# Patient Record
Sex: MALE | Race: WHITE | NOT HISPANIC OR LATINO | Employment: FULL TIME | URBAN - METROPOLITAN AREA
[De-identification: names, ages, dates, MRNs, and addresses within clinical notes are randomized per-mention and may not be internally consistent; named-entity substitution may affect disease eponyms.]

---

## 2020-03-16 ENCOUNTER — TELEPHONE (OUTPATIENT)
Dept: FAMILY MEDICINE | Facility: CLINIC | Age: 50
End: 2020-03-16

## 2020-03-17 ENCOUNTER — TELEPHONE (OUTPATIENT)
Dept: FAMILY MEDICINE | Facility: CLINIC | Age: 50
End: 2020-03-17

## 2020-03-17 NOTE — TELEPHONE ENCOUNTER
LMOR letting pt know we are having to cancel the appointment for tomorrow but to call back because we are able to get him scheduled for a virtual visit.

## 2020-04-22 ENCOUNTER — TELEPHONE (OUTPATIENT)
Dept: FAMILY MEDICINE | Facility: CLINIC | Age: 50
End: 2020-04-22

## 2020-04-22 NOTE — TELEPHONE ENCOUNTER
Tried calling pt to see about getting him rescheduled from his canceled appt on 3/18. Not able to leave a message due to voicemail box not set up yet

## 2020-06-29 ENCOUNTER — TELEPHONE (OUTPATIENT)
Dept: FAMILY MEDICINE | Facility: CLINIC | Age: 50
End: 2020-06-29

## 2020-06-29 NOTE — TELEPHONE ENCOUNTER
----- Message from Miguel Barrera sent at 6/29/2020  9:56 AM CDT -----  Regarding: R/s appt  Contact: Stewart Barreto  Pt would like to schedule a virtual visit appt with Dr. Morris the mid/ End of July on a Monday, Thurs, or Friday . His last visit was cancelled due to the virus outbreak . Please give the patient a call back # [phone]

## 2020-06-29 NOTE — TELEPHONE ENCOUNTER
Spoke with pt and let him know since we have not seen him in over a year we have to get him in office. Patient agrees.

## 2020-07-14 ENCOUNTER — OFFICE VISIT (OUTPATIENT)
Dept: ALLERGY | Facility: CLINIC | Age: 50
End: 2020-07-14
Payer: COMMERCIAL

## 2020-07-14 ENCOUNTER — LAB VISIT (OUTPATIENT)
Dept: LAB | Facility: HOSPITAL | Age: 50
End: 2020-07-14
Attending: ALLERGY & IMMUNOLOGY
Payer: COMMERCIAL

## 2020-07-14 VITALS
OXYGEN SATURATION: 97 % | HEART RATE: 95 BPM | BODY MASS INDEX: 40.9 KG/M2 | HEIGHT: 72 IN | SYSTOLIC BLOOD PRESSURE: 112 MMHG | WEIGHT: 302 LBS | DIASTOLIC BLOOD PRESSURE: 76 MMHG | TEMPERATURE: 97 F

## 2020-07-14 DIAGNOSIS — J31.0 CHRONIC RHINITIS: ICD-10-CM

## 2020-07-14 DIAGNOSIS — G12.20 MOTOR NEURON DISEASE: ICD-10-CM

## 2020-07-14 DIAGNOSIS — J31.0 CHRONIC RHINITIS: Primary | ICD-10-CM

## 2020-07-14 PROCEDURE — 3008F BODY MASS INDEX DOCD: CPT | Mod: S$GLB,,, | Performed by: ALLERGY & IMMUNOLOGY

## 2020-07-14 PROCEDURE — 99203 OFFICE O/P NEW LOW 30 MIN: CPT | Mod: S$GLB,,, | Performed by: ALLERGY & IMMUNOLOGY

## 2020-07-14 PROCEDURE — 3008F PR BODY MASS INDEX (BMI) DOCUMENTED: ICD-10-PCS | Mod: S$GLB,,, | Performed by: ALLERGY & IMMUNOLOGY

## 2020-07-14 PROCEDURE — 86003 ALLG SPEC IGE CRUDE XTRC EA: CPT

## 2020-07-14 PROCEDURE — 99203 PR OFFICE/OUTPT VISIT, NEW, LEVL III, 30-44 MIN: ICD-10-PCS | Mod: S$GLB,,, | Performed by: ALLERGY & IMMUNOLOGY

## 2020-07-14 PROCEDURE — 36415 COLL VENOUS BLD VENIPUNCTURE: CPT

## 2020-07-14 RX ORDER — MAGNESIUM OXIDE 500 MG
TABLET ORAL
COMMUNITY
Start: 1996-07-01

## 2020-07-14 RX ORDER — DIAPER,BRIEF,ADULT, DISPOSABLE
1 EACH MISCELLANEOUS
COMMUNITY
End: 2024-03-25

## 2020-07-14 RX ORDER — BENZOCAINE .13; .15; .5; 2 G/100G; G/100G; G/100G; G/100G
1 GEL ORAL 2 TIMES DAILY
Qty: 8.6 G | Refills: 3 | Status: SHIPPED | OUTPATIENT
Start: 2020-07-14 | End: 2022-03-07

## 2020-07-14 RX ORDER — OMEGA-3-ACID ETHYL ESTERS 1 G/1
CAPSULE, LIQUID FILLED ORAL
COMMUNITY
End: 2020-07-30

## 2020-07-14 RX ORDER — AZELASTINE 1 MG/ML
1 SPRAY, METERED NASAL 2 TIMES DAILY
Qty: 30 ML | Refills: 3 | Status: SHIPPED | OUTPATIENT
Start: 2020-07-14 | End: 2022-03-07

## 2020-07-14 RX ORDER — TEMAZEPAM 15 MG/1
15 CAPSULE ORAL
COMMUNITY
End: 2020-07-30 | Stop reason: SDUPTHER

## 2020-07-14 RX ORDER — FAMOTIDINE 20 MG/1
20 TABLET, FILM COATED ORAL
COMMUNITY

## 2020-07-14 RX ORDER — COLCHICINE 0.6 MG/1
0.6 TABLET ORAL DAILY PRN
COMMUNITY
End: 2020-07-30 | Stop reason: SDUPTHER

## 2020-07-14 RX ORDER — LANOLIN ALCOHOL/MO/W.PET/CERES
CREAM (GRAM) TOPICAL
COMMUNITY
Start: 2008-07-01

## 2020-07-14 NOTE — PATIENT INSTRUCTIONS
Nose:  Saline first 1-2 times per day  Next azelastine 1 spray per nare twice per day - if symptoms worsen, may use up to 4 times per day - haley, may use as needed - congestion and post nasal drip   Then rhinocort 1 spray per nare twice per day- batman     Technique:  Head down  Aim up and out   Spray don't sniff      As needed zyrtec for itching, sneezing     [facility]- you do not need to fast   - may get done today.       If symptoms get flared, you can use distilled water and xlear packets for saline instead and this can help clean out sinuses better.         Follow up in 4-6 weeks , sooner if needed

## 2020-07-14 NOTE — PROGRESS NOTES
Subjective:       Patient ID: Stewart Barreto is a 49 y.o. male.    Chief Complaint: Rhinitis (runny nose, throat clearing, snoring. Nasal rinses do help) and Recurrent Sinusitis (2-3 times per year takes antibiotics)    HPI     Pt presents as a new patient for rhinitis    Onset: 4 th grade   Sx: pnd, rn, throat clearing.   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   Or neuromuscular disorder.   Every 2 weeks gets IVIG for this.   He is uncertain of dose.   2.5 -3.5 hours  With saline bolus.   Neurologist Dr. Kymberly hanna. [facility] [place].     Review of Systems      General: neg unexpected weight changes, fevers, chills, night sweats, malaise  HEENT: see hpi, Neg eye pain, vision changes, ear drainage, nose bleeds, throat tightness, sores in the mouth  CV: Neg chest pain, palpitations, swelling  Resp: see hpi, neg shortness of breath, hemoptysis, cough  GI: see hpi, neg dysphagia, night abdominal pain, reflux, chronic diarrhea, chronic constipation  Derm: See Hpi, neg new rash, neg flushing  Mu/sk: Neg joint pain, joint swelling   Psych: Neg anxiety  neuro: neg chronic headaches, muscle weakness  Endo: neg heat/cold intolerance, chronic fatigue    Objective:     Vitals:    07/14/20 1412   BP: 112/76   Pulse: 95   Temp: 97.1 °F (36.2 °C)   TempSrc: Temporal   SpO2: 97%   Weight: (!) 137 kg (302 lb)   Height: 6' (1.829 m)        Physical Exam    General: no acute distress, well developed well nourished   HEENT:   Head:normocephalic atraumatic  Eyes: GRACE, EOMI, Neg injection, scleral icterus, or conjunctival papillary hypertrophy.  Ears: tm clear bilaterally, normal canal  Nose: 2-3+  inferior turbinates pink, neg nasal polyps            Mucosa: moist             Septal irritation: none   OP: mucus membranes moist, - cobblestoning, + PND, neg erythema or lesions  Neck: supple, Full range of motion, neg lymphadenopathy  Chest: full respiratory excursion no abnormal chest abnormality  Resp: clear to ascultation bilaterally  CV: RRR, neg MRG, brisk capillary refill  Abdomen: BS+, non tender, non distended  Ext:  Neg clubbing, cyanosis, pitting edema  Skin: Neg rashes or lesions  Lymph: neg supraclavicular, axillary     Assessment:       1. Chronic rhinitis    2. Motor neuron disease        Plan:       Chronic rhinitis  -     Inhalant Panel; Future; Expected date: 07/14/2020  -     azelastine (ASTELIN) 137 mcg (0.1 %) nasal spray; 1 spray (137 mcg total) by Nasal route 2 (two) times daily.  Dispense: 30 mL; Refill: 3  -     budesonide (RINOCORT AQUA) 32 mcg/actuation nasal spray; 1 spray (32 mcg total) by Nasal route 2 (two) times daily.  Dispense: 8.6 g; Refill: 3    Motor neuron disease      Chronic rhinitis  Continue saline   Start INS and ZIA routinely   Allergy test serum IgE - [facility].     Motor Neruon disease   Continue neurology IVIG therapy, likely 2g/kg dosing or higher.     Follow up in 4-6 weeks sooner if needed        Preethi Ponce M.D.  Allergy/Immunology  [facility]   [phone] phone  [phone] fax

## 2020-07-18 LAB
A ALTERNATA IGE QN: <0.1 KU/L
A FUMIGATUS IGE QN: <0.1 KU/L
ALLERGEN CLASS DESCRIPTION: NORMAL
ALLERGEN SILVER BIRCH TREE IGE: <0.1 KU/L
AMER ROACH IGE QN: <0.1 KU/L
BAHIA GRASS IGE QN: <0.1 KU/L
BALD CYPRESS IGE QN: <0.1 KU/L
BERMUDA GRASS IGE QN: <0.1 KU/L
BOXELDER IGE QN: <0.1 KU/L
C ALBICANS IGE QN: <0.1 KU/L
C ALBICANS IGE QN: <0.1 KU/L
C HERBARUM IGE QN: <0.1 KU/L
CAT DANDER IGE QN: <0.1 KU/L
CMN PIGWEED IGE QN: <0.1 KU/L
COCKLEBUR IGE QN: <0.1 KU/L
COMMON RAGWEED IGE QN: <0.1 KU/L
COTTONWOOD IGE QN: <0.1 KU/L
CURVULARIA IGE QN: <0.1 KU/L
D FARINAE IGE QN: <0.1 KU/L
D PTERONYSS IGE QN: <0.1 KU/L
DOG DANDER IGE QN: <0.1 KU/L
DOG FENNEL IGE QN: <0.1 KU/L
E PURPURASCENS IGE QN: <0.1 KU/L
ENGL PLANTAIN IGE QN: <0.1 KU/L
F MONILIFORME IGE QN: <0.1 KU/L
GIANT RAGWEED IGE QN: <0.1 KU/L
HACKBERRY TREE IGE QN: <0.1 KU/L
JOHNSON GRASS IGE QN: <0.1 KU/L
LONDON PLANE IGE QN: <0.1 KU/L
MARSH ELDER IGE QN: <0.1 KU/L
MOUSE EPITH IGE QN: <0.1 KU/L
MOUSE URINE PROT IGE QN: <0.1 KU/L
MUGWORT IGE QN: <0.1 KU/L
NETTLE IGE QN: <0.1 KU/L
P NOTATUM IGE QN: <0.1 KU/L
PECAN/HICK TREE IGE QN: <0.1 KU/L
PRIVET IGE QN: <0.1 KU/L
ROACH IGE QN: <0.1 KU/L
SALTWORT IGE QN: <0.1 KU/L
SHEEP SORREL IGE QN: <0.1 KU/L
SWEET GUM IGE QN: <0.1 KU/L
T RUBRUM IGE QN: <0.1 KU/L
TIMOTHY IGE QN: <0.1 KU/L
WHITE ASH IGE QN: <0.1 KU/L
WHITE ELM IGE QN: <0.1 KU/L
WHITE MULBERRY IGE QN: <0.1 KU/L
WHITE OAK IGE QN: <0.1 KU/L
WORMWOOD IGE QN: <0.1 KU/L
YELLOW DOCK IGE QN: <0.1 KU/L

## 2020-07-30 ENCOUNTER — OFFICE VISIT (OUTPATIENT)
Dept: FAMILY MEDICINE | Facility: CLINIC | Age: 50
End: 2020-07-30
Payer: COMMERCIAL

## 2020-07-30 VITALS
WEIGHT: 307 LBS | BODY MASS INDEX: 41.58 KG/M2 | HEART RATE: 72 BPM | DIASTOLIC BLOOD PRESSURE: 78 MMHG | TEMPERATURE: 98 F | SYSTOLIC BLOOD PRESSURE: 118 MMHG | HEIGHT: 72 IN

## 2020-07-30 DIAGNOSIS — E78.2 MIXED HYPERLIPIDEMIA: ICD-10-CM

## 2020-07-30 DIAGNOSIS — M1A.09X0 IDIOPATHIC CHRONIC GOUT OF MULTIPLE SITES WITHOUT TOPHUS: ICD-10-CM

## 2020-07-30 DIAGNOSIS — E66.01 MORBID OBESITY WITH BMI OF 40.0-44.9, ADULT: ICD-10-CM

## 2020-07-30 DIAGNOSIS — G12.20 MOTOR NEURON DISEASE: ICD-10-CM

## 2020-07-30 DIAGNOSIS — J31.0 CHRONIC RHINITIS: ICD-10-CM

## 2020-07-30 DIAGNOSIS — F51.01 PRIMARY INSOMNIA: ICD-10-CM

## 2020-07-30 DIAGNOSIS — G61.82 MULTIFOCAL MOTOR NEUROPATHY: Primary | ICD-10-CM

## 2020-07-30 PROCEDURE — 3008F PR BODY MASS INDEX (BMI) DOCUMENTED: ICD-10-PCS | Mod: S$GLB,,, | Performed by: FAMILY MEDICINE

## 2020-07-30 PROCEDURE — 3008F BODY MASS INDEX DOCD: CPT | Mod: S$GLB,,, | Performed by: FAMILY MEDICINE

## 2020-07-30 PROCEDURE — 99214 OFFICE O/P EST MOD 30 MIN: CPT | Mod: S$GLB,,, | Performed by: FAMILY MEDICINE

## 2020-07-30 PROCEDURE — 99214 PR OFFICE/OUTPT VISIT, EST, LEVL IV, 30-39 MIN: ICD-10-PCS | Mod: S$GLB,,, | Performed by: FAMILY MEDICINE

## 2020-07-30 RX ORDER — BENZOCAINE .13; .15; .5; 2 G/100G; G/100G; G/100G; G/100G
1 GEL ORAL 2 TIMES DAILY
Qty: 8.6 G | Refills: 3 | Status: CANCELLED | OUTPATIENT
Start: 2020-07-30

## 2020-07-30 RX ORDER — AZELASTINE 1 MG/ML
1 SPRAY, METERED NASAL 2 TIMES DAILY
Qty: 30 ML | Refills: 3 | Status: CANCELLED | OUTPATIENT
Start: 2020-07-30 | End: 2021-07-30

## 2020-07-30 RX ORDER — DOCUSATE SODIUM 100 MG/1
100 CAPSULE, LIQUID FILLED ORAL 2 TIMES DAILY
COMMUNITY

## 2020-07-30 RX ORDER — TEMAZEPAM 15 MG/1
15 CAPSULE ORAL NIGHTLY
Qty: 90 CAPSULE | Refills: 1 | Status: SHIPPED | OUTPATIENT
Start: 2020-07-30 | End: 2022-03-07 | Stop reason: SDUPTHER

## 2020-07-30 RX ORDER — COLCHICINE 0.6 MG/1
0.6 TABLET ORAL DAILY PRN
Qty: 90 TABLET | Refills: 1 | Status: SHIPPED | OUTPATIENT
Start: 2020-07-30

## 2020-07-30 RX ORDER — GLUCOSAM/CHONDRO/HERB 149/HYAL 750-100 MG
TABLET ORAL
COMMUNITY

## 2020-07-30 NOTE — PROGRESS NOTES
SUBJECTIVE:    Patient ID: Stewart Barreto is a 49 y.o. male.    Chief Complaint: Annual Exam (brought bottles  )    This 49-year-old male works out at [employer]  and also is working from home part of the week.    He has multifocal motor neuropathy-sees  neurology in [facility].  He is maintained on immunoglobulin infusions every other week.  This is greatly helped his motor function.  He states he does not get many side effects from the infusions anymore.  No nausea or headaches, just fatigue.  His neuropathy symptoms include tremors weakness to the arms drop foot and falling.  He states that is hard to walk long distances such as a mi.  He now has a stationary bike to exercise with and he is getting a treadmill.    Allergy-saw Dr Ponce allergy testings failed to reveal any obvious allergens.  He is taken office Zyrtec and placed on daily Rhinocort and saline rinses.  Patient occasionally has a low O2 sats in the 91-95% range he does not have asthma or COPD.    He is obese and weighs 307 lb..      Lab Visit on 07/14/2020   Component Date Value Ref Range Status    Allergen Class Description 07/14/2020 Comment   Final    Mite Dust Pteronyssinus IgE 07/14/2020 <0.10  Class 0 kU/L Final    D. farinae 07/14/2020 <0.10  Class 0 kU/L Final    Cat Dander (E1) IgE 07/14/2020 <0.10  Class 0 kU/L Final    Dog Dander, IgE 07/14/2020 <0.10  Class 0 kU/L Final    Bermuda Grass 07/14/2020 <0.10  Class 0 kU/L Final    Hood Grass 07/14/2020 <0.10  Class 0 kU/L Final    Sonny Grass 07/14/2020 <0.10  Class 0 kU/L Final    Bahia Grass 07/14/2020 <0.10  Class 0 kU/L Final    Allergen, Cockroach, American, IgE 07/14/2020 <0.10  Class 0 kU/L Final    Penicillium, IgE 07/14/2020 <0.10  Class 0 kU/L Final    Cladosporium, IgE 07/14/2020 <0.10  Class 0 kU/L Final    Aspergillus fumigatus 07/14/2020 <0.10  Class 0 kU/L Final    Alternaria alternata 07/14/2020 <0.10  Class 0 kU/L Final    Mouse Epithelia  07/14/2020 <0.10  Class 0 kU/L Final    Allergen, Mouse Urine, IgE 07/14/2020 <0.10  Class 0 kU/L Final    IgE Cockroach, Ghanaian 07/14/2020 <0.10  Class 0 kU/L Final    Allergen Candida albicans IgE 07/14/2020 <0.10  Class 0 kU/L Final    Setomelanomma Rostrata IgE 07/14/2020 <0.10  Class 0 kU/L Final    L394-RtU Fusarium proliferatum 07/14/2020 <0.10  Class 0 kU/L Final    Epicoccum purpurascens, IgE 07/14/2020 <0.10  Class 0 kU/L Final    Bipolaris IgE 07/14/2020 <0.10  Class 0 kU/L Final    RAST Allergen for Trichophyton rub* 07/14/2020 <0.10  Class 0 kU/L Final    Allergen Maple ([place]) IgE 07/14/2020 <0.10  Class 0 kU/L Final    Silver Birch IgE 07/14/2020 <0.10  Class 0 kU/L Final    Zortman(Quercus alba) IgE 07/14/2020 <0.10  Class 0 kU/L Final    R988-SmW Elm, American (White 07/14/2020 <0.10  Class 0 kU/L Final    Maple/[place] IgE 07/14/2020 <0.10  Class 0 kU/L Final    Calcasieu IgE 07/14/2020 <0.10  Class 0 kU/L Final    White Andres, IgE 07/14/2020 <0.10  Class 0 kU/L Final    Pecan [place] Tree 07/14/2020 <0.10  Class 0 kU/L Final    [place] 07/14/2020 <0.10  Class 0 kU/L Final    Hackberry Celtis, IgE 07/14/2020 <0.10  Class 0 kU/L Final    [place] (T70) IgE 07/14/2020 <0.10  Class 0 kU/L Final    Privet Tree, IGE 07/14/2020 <0.10  Class 0 kU/L Final    Allergen Sweet Gum IgE 07/14/2020 <0.10  Class 0 kU/L Final    Ragweed, Short, IgE 07/14/2020 <0.10  Class 0 kU/L Final    W048-NlQ Ragweed, Giant 07/14/2020 <0.10  Class 0 kU/L Final    Wormwood IgE 07/14/2020 <0.10  Class 0 kU/L Final    Mugwort 07/14/2020 <0.10  Class 0 kU/L Final    Plantain 07/14/2020 <0.10  Class 0 kU/L Final    Russian Thistle IgE 07/14/2020 <0.10  Class 0 kU/L Final    Cocklebur, IgE 07/14/2020 <0.10  Class 0 kU/L Final    Common Pigweed IgE 07/14/2020 <0.10  Class 0 kU/L Final    Rough Marshelder 07/14/2020 <0.10  Class 0 kU/L Final    Allergen Sheep Sorrel ([place]) I* 07/14/2020  <0.10  Class 0 kU/L Final    Nettle 07/14/2020 <0.10  Class 0 kU/L Final    Yellow Dockweed IgE 07/14/2020 <0.10  Class 0 kU/L Final    Dog Fennel IgE 07/14/2020 <0.10  Class 0 kU/L Final       No past medical history on file.  Past Surgical History:   Procedure Laterality Date    TONSILLECTOMY       Family History   Problem Relation Age of Onset    Allergic rhinitis Mother     Rhinitis Father     Asthma Father        Marital Status:   Alcohol History:  reports current alcohol use.  Tobacco History:  reports that he quit smoking about 10 years ago. He has never used smokeless tobacco.  Drug History:  has no history on file for drug.    Review of patient's allergies indicates:  No Known Allergies    Current Outpatient Medications:     azelastine (ASTELIN) 137 mcg (0.1 %) nasal spray, 1 spray (137 mcg total) by Nasal route 2 (two) times daily., Disp: 30 mL, Rfl: 3    budesonide (RINOCORT AQUA) 32 mcg/actuation nasal spray, 1 spray (32 mcg total) by Nasal route 2 (two) times daily., Disp: 8.6 g, Rfl: 3    cetirizine 10 mg Cap, Take by mouth., Disp: , Rfl:     colchicine (COLCRYS) 0.6 mg tablet, Take 1 tablet (0.6 mg total) by mouth daily as needed., Disp: 90 tablet, Rfl: 1    cranberry conc-ascorbic acid 4,200-20 mg Cap, Take 1 capsule by mouth., Disp: , Rfl:     docusate sodium (COLACE) 100 MG capsule, Take 100 mg by mouth 2 (two) times daily., Disp: , Rfl:     famotidine (PEPCID) 20 MG tablet, Take 20 mg by mouth., Disp: , Rfl:     immun glob G,IgG,/pro/IgA 0-50 (PRIVIGEN IV), Inject into the vein every 14 (fourteen) days., Disp: , Rfl:     melatonin 5 mg TbIE, , Disp: , Rfl:     multivitamin Tab, , Disp: , Rfl:     niacin 250 MG CpSR, , Disp: , Rfl:     omega 3-dha-epa-fish oil (FISH OIL) 1,000 mg (120 mg-180 mg) Cap, Take by mouth., Disp: , Rfl:     temazepam (RESTORIL) 15 mg Cap, Take 1 capsule (15 mg total) by mouth every evening., Disp: 90 capsule, Rfl: 1    Review of Systems    Constitutional: Negative for appetite change, chills, fatigue, fever and unexpected weight change.   HENT: Negative for congestion, ear pain and trouble swallowing.    Eyes: Negative for pain, discharge and visual disturbance.   Respiratory: Negative for apnea, cough, shortness of breath and wheezing.    Cardiovascular: Negative for chest pain and leg swelling.   Gastrointestinal: Negative for abdominal pain, blood in stool, constipation, diarrhea, nausea and vomiting.   Endocrine: Negative for cold intolerance, heat intolerance and polydipsia.   Genitourinary: Negative for dysuria, hematuria, testicular pain and urgency.   Musculoskeletal: Negative for joint swelling and myalgias. Gait problem: hard to walk 1  mile.        No recent gout attacks   Neurological: Positive for tremors and weakness (Weakness to hands, unable to walk long distances). Negative for dizziness, seizures and numbness.        When untreated, gets tremor,weakness  To arms  And  Hands ,falls, dropfoot.   Psychiatric/Behavioral: Negative for agitation, behavioral problems and hallucinations. The patient is not nervous/anxious.           Objective:      Vitals:    07/30/20 0807   BP: 118/78   Pulse: 72   Temp: 98 °F (36.7 °C)   Weight: (!) 139.3 kg (307 lb)   Height: 6' (1.829 m)     Body mass index is 41.64 kg/m².  Physical Exam  Vitals signs and nursing note reviewed.   Constitutional:       Appearance: He is well-developed. He is obese.   HENT:      Head: Normocephalic and atraumatic.      Right Ear: External ear normal.      Left Ear: External ear normal.      Nose: Nose normal.   Eyes:      Pupils: Pupils are equal, round, and reactive to light.   Neck:      Musculoskeletal: Normal range of motion and neck supple.      Thyroid: No thyromegaly.      Vascular: No carotid bruit.   Cardiovascular:      Rate and Rhythm: Normal rate and regular rhythm.      Heart sounds: Normal heart sounds. No murmur.   Pulmonary:      Effort: Pulmonary effort  is normal.      Breath sounds: Normal breath sounds. No wheezing or rales.   Abdominal:      General: Bowel sounds are normal. There is no distension.      Palpations: Abdomen is soft.      Tenderness: There is no abdominal tenderness.   Musculoskeletal: Normal range of motion.         General: No tenderness or deformity.      Lumbar back: Normal. He exhibits no pain and no spasm.      Comments: Bends 90 degrees at  waist, shoulders and knees have full range of motion.  No pitting edema to lower extremities   Lymphadenopathy:      Cervical: No cervical adenopathy.   Skin:     General: Skin is warm and dry.      Findings: No rash.   Neurological:      Mental Status: He is alert and oriented to person, place, and time.      Cranial Nerves: No cranial nerve deficit.      Coordination: Coordination normal.      Comments: Motor strength is 3 or 4/5 in his upper extremities   Psychiatric:         Behavior: Behavior normal.         Thought Content: Thought content normal.         Judgment: Judgment normal.           Assessment:       1. Multifocal motor neuropathy    2. Chronic rhinitis    3. Primary insomnia    4. Idiopathic chronic gout of multiple sites without tophus    5. Motor neuron disease    6. Morbid obesity with BMI of 40.0-44.9, adult    7. Mixed hyperlipidemia         Plan:       Multifocal motor neuropathy  Continue infusion q.2 weeks of immunoglobulins  Chronic rhinitis    Primary insomnia  -     temazepam (RESTORIL) 15 mg Cap; Take 1 capsule (15 mg total) by mouth every evening.  Dispense: 90 capsule; Refill: 1  Refill temazepam  Idiopathic chronic gout of multiple sites without tophus  -     colchicine (COLCRYS) 0.6 mg tablet; Take 1 tablet (0.6 mg total) by mouth daily as needed.  Dispense: 90 tablet; Refill: 1  No gout attacks on cultures  Motor neuron disease    Morbid obesity with BMI of 40.0-44.9, adult  Instructed patient a low carb diet or a keto diet with greatly assist with weight loss.  Many of  his symptoms would improve with weight loss.  We set a goal of 15-18 lb in the next 6 months  Mixed hyperlipidemia  Continue niacin, cholesterol 180 triglycerides 146, TSH okay at 2.5    Follow up in about 6 months (around 1/30/2021).

## 2020-07-31 ENCOUNTER — PATIENT MESSAGE (OUTPATIENT)
Dept: FAMILY MEDICINE | Facility: CLINIC | Age: 50
End: 2020-07-31

## 2020-07-31 PROBLEM — E66.01 MORBID OBESITY WITH BMI OF 40.0-44.9, ADULT: Status: ACTIVE | Noted: 2020-07-31

## 2020-07-31 PROBLEM — E78.2 MIXED HYPERLIPIDEMIA: Status: RESOLVED | Noted: 2020-07-31 | Resolved: 2020-07-31

## 2020-07-31 PROBLEM — E78.2 MIXED HYPERLIPIDEMIA: Status: ACTIVE | Noted: 2020-07-31

## 2020-07-31 PROBLEM — F51.01 PRIMARY INSOMNIA: Status: ACTIVE | Noted: 2020-07-31

## 2020-08-25 ENCOUNTER — OFFICE VISIT (OUTPATIENT)
Dept: ALLERGY | Facility: CLINIC | Age: 50
End: 2020-08-25
Payer: COMMERCIAL

## 2020-08-25 VITALS
TEMPERATURE: 98 F | OXYGEN SATURATION: 98 % | WEIGHT: 300 LBS | BODY MASS INDEX: 40.63 KG/M2 | HEIGHT: 72 IN | DIASTOLIC BLOOD PRESSURE: 83 MMHG | HEART RATE: 91 BPM | SYSTOLIC BLOOD PRESSURE: 128 MMHG

## 2020-08-25 DIAGNOSIS — G61.82 MULTIFOCAL MOTOR NEUROPATHY: ICD-10-CM

## 2020-08-25 DIAGNOSIS — G47.00 INSOMNIA, UNSPECIFIED TYPE: ICD-10-CM

## 2020-08-25 DIAGNOSIS — J31.0 CHRONIC RHINITIS: Primary | ICD-10-CM

## 2020-08-25 PROCEDURE — 3008F PR BODY MASS INDEX (BMI) DOCUMENTED: ICD-10-PCS | Mod: S$GLB,,, | Performed by: ALLERGY & IMMUNOLOGY

## 2020-08-25 PROCEDURE — 99213 PR OFFICE/OUTPT VISIT, EST, LEVL III, 20-29 MIN: ICD-10-PCS | Mod: S$GLB,,, | Performed by: ALLERGY & IMMUNOLOGY

## 2020-08-25 PROCEDURE — 3008F BODY MASS INDEX DOCD: CPT | Mod: S$GLB,,, | Performed by: ALLERGY & IMMUNOLOGY

## 2020-08-25 PROCEDURE — 99213 OFFICE O/P EST LOW 20 MIN: CPT | Mod: S$GLB,,, | Performed by: ALLERGY & IMMUNOLOGY

## 2020-08-25 NOTE — PROGRESS NOTES
Subjective:       Patient ID: Stewart Barreto is a 49 y.o. male.    Chief Complaint: non allergic rhinitis (has not been using nose sprays diligently, but feels that they work well when he does use them)    HPI     Pt presents for Non Allergic rhinitis    Condition: improved   Onset: 4 th grade   Sx: pnd, rn, throat clearing.   Allergy testing: serum ige 7/2020- negative   Trigger: spring season , ligustrum   Tx: benadryl, zyrtec daily, saline rinse  Ins and marcus rx at initial visit for routine use.     Pt does note he has sinusitis 2-3 times per year  Always needing abx to resolve   mucinex   Takes 2 weeks to recover   The past few years the infections are not as frequent    No infections since last visit.     Pt is on IVIG currently for Multi motor focal neuropathy.   Or neuromuscular disorder.   Every 2 weeks gets IVIG for this.   He is uncertain of dose.   2.5 -3.5 hours  With saline bolus.   Neurologist Dr. Kymberly hanna. [facility] [place].     Review of Systems      General: neg unexpected weight changes, fevers, chills, night sweats, malaise  HEENT: see hpi, Neg eye pain, vision changes, ear drainage, nose bleeds, throat tightness, sores in the mouth  CV: Neg chest pain, palpitations, swelling  Resp: see hpi, neg shortness of breath, hemoptysis, cough  GI: see hpi, neg dysphagia, night abdominal pain, reflux, chronic diarrhea, chronic constipation  Derm: See Hpi, neg new rash, neg flushing  Mu/sk: Neg joint pain, joint swelling   Psych: Neg anxiety  neuro: neg chronic headaches, muscle weakness  Endo: neg heat/cold intolerance, chronic fatigue    Objective:     Vitals:    08/25/20 1439   BP: 128/83   Pulse: 91   Temp: 97.5 °F (36.4 °C)   SpO2: 98%   Weight: 136.1 kg (300 lb)   Height: 6' (1.829 m)        Physical Exam    General: no acute distress, well developed well nourished   HEENT:   Head:normocephalic atraumatic  Eyes: GRACE, EOMI, Neg injection, scleral icterus, or conjunctival papillary hypertrophy.  Ears: tm  clear bilaterally, normal canal  Nose: 2-3+ inferior turbinates pink, neg nasal polyps            Mucosa: moist             Septal irritation: none   OP: mucus membranes moist, - cobblestoning, - PND, neg erythema or lesions  Neck: supple, Full range of motion, neg lymphadenopathy  Chest: full respiratory excursion no abnormal chest abnormality  Resp: clear to ascultation bilaterally  CV: RRR, neg MRG, brisk capillary refill  Abdomen: BS+, non tender, non distended  Ext:  Neg clubbing, cyanosis, pitting edema  Skin: Neg rashes or lesions  Lymph: neg supraclavicular, axillary     Assessment:       1. Chronic rhinitis    2. Multifocal motor neuropathy    3. Insomnia, unspecified type        Plan:       Chronic rhinitis    Multifocal motor neuropathy    Insomnia, unspecified type      Chronic rhinitis with a history of recurrent sinus infections. He is on IVIG and this will treat PID if found.   Continue saline   INS and ZIA routinely - INS every other day, ZIA at night prn.   Allergy test serum IgE - [facility]- negative non allergic     Improved    Motor Neruon disease   Continue neurology IVIG therapy, likely 2g/kg dosing or higher.     Follow up in 6 months, sooner if needed        Preethi Ponce M.D.  Allergy/Immunology  [facility]   [phone] phone  [phone] fax

## 2020-08-25 NOTE — PATIENT INSTRUCTIONS
Intranasal steroid every other day - batdavid Kwong- try at night as needed for congestion and see if this helps.     See if the days you use batman twice if that's when the insomnia occurs.       Message me and let me know how it goes.       Increasing saline may also benefit you drug free.     Otherwise, let's follow up in 6-12 months, sooner if needed.

## 2021-02-04 ENCOUNTER — TELEPHONE (OUTPATIENT)
Dept: FAMILY MEDICINE | Facility: CLINIC | Age: 51
End: 2021-02-04

## 2021-02-04 DIAGNOSIS — Z79.899 ENCOUNTER FOR LONG-TERM (CURRENT) USE OF OTHER MEDICATIONS: Primary | ICD-10-CM

## 2021-02-04 DIAGNOSIS — E78.2 MIXED HYPERLIPIDEMIA: ICD-10-CM

## 2021-03-08 ENCOUNTER — TELEPHONE (OUTPATIENT)
Dept: FAMILY MEDICINE | Facility: CLINIC | Age: 51
End: 2021-03-08

## 2022-02-02 ENCOUNTER — TELEPHONE (OUTPATIENT)
Dept: FAMILY MEDICINE | Facility: CLINIC | Age: 52
End: 2022-02-02
Payer: COMMERCIAL

## 2022-02-02 ENCOUNTER — OFFICE VISIT (OUTPATIENT)
Dept: FAMILY MEDICINE | Facility: CLINIC | Age: 52
End: 2022-02-02
Payer: COMMERCIAL

## 2022-02-02 ENCOUNTER — TELEPHONE (OUTPATIENT)
Dept: FAMILY MEDICINE | Facility: CLINIC | Age: 52
End: 2022-02-02

## 2022-02-02 VITALS
DIASTOLIC BLOOD PRESSURE: 88 MMHG | WEIGHT: 303 LBS | SYSTOLIC BLOOD PRESSURE: 136 MMHG | BODY MASS INDEX: 41.04 KG/M2 | HEIGHT: 72 IN | HEART RATE: 76 BPM

## 2022-02-02 DIAGNOSIS — E78.2 MIXED HYPERLIPIDEMIA: ICD-10-CM

## 2022-02-02 DIAGNOSIS — N30.01 ACUTE CYSTITIS WITH HEMATURIA: ICD-10-CM

## 2022-02-02 DIAGNOSIS — R31.0 GROSS HEMATURIA: Primary | ICD-10-CM

## 2022-02-02 DIAGNOSIS — Z79.899 ENCOUNTER FOR LONG-TERM (CURRENT) USE OF OTHER MEDICATIONS: Primary | ICD-10-CM

## 2022-02-02 LAB
BILIRUB UR QL STRIP: NEGATIVE
GLUCOSE UR QL STRIP: NEGATIVE
KETONES UR QL STRIP: NEGATIVE
LEUKOCYTE ESTERASE UR QL STRIP: NEGATIVE
PH, POC UA: 7.5
POC BLOOD, URINE: NEGATIVE
POC NITRATES, URINE: NEGATIVE
PROT UR QL STRIP: NEGATIVE
SP GR UR STRIP: 1.01 (ref 1–1.03)
UROBILINOGEN UR STRIP-ACNC: 0.2 (ref 0.3–2.2)

## 2022-02-02 PROCEDURE — 99213 PR OFFICE/OUTPT VISIT, EST, LEVL III, 20-29 MIN: ICD-10-PCS | Mod: S$GLB,,, | Performed by: NURSE PRACTITIONER

## 2022-02-02 PROCEDURE — 3008F BODY MASS INDEX DOCD: CPT | Mod: S$GLB,,, | Performed by: NURSE PRACTITIONER

## 2022-02-02 PROCEDURE — 99213 OFFICE O/P EST LOW 20 MIN: CPT | Mod: S$GLB,,, | Performed by: NURSE PRACTITIONER

## 2022-02-02 PROCEDURE — 81003 URINALYSIS AUTO W/O SCOPE: CPT | Mod: QW,S$GLB,, | Performed by: NURSE PRACTITIONER

## 2022-02-02 PROCEDURE — 3079F DIAST BP 80-89 MM HG: CPT | Mod: S$GLB,,, | Performed by: NURSE PRACTITIONER

## 2022-02-02 PROCEDURE — 3075F SYST BP GE 130 - 139MM HG: CPT | Mod: S$GLB,,, | Performed by: NURSE PRACTITIONER

## 2022-02-02 PROCEDURE — 1160F RVW MEDS BY RX/DR IN RCRD: CPT | Mod: S$GLB,,, | Performed by: NURSE PRACTITIONER

## 2022-02-02 PROCEDURE — 81003 POCT URINALYSIS, DIPSTICK, AUTOMATED, W/O SCOPE: ICD-10-PCS | Mod: QW,S$GLB,, | Performed by: NURSE PRACTITIONER

## 2022-02-02 PROCEDURE — 1160F PR REVIEW ALL MEDS BY PRESCRIBER/CLIN PHARMACIST DOCUMENTED: ICD-10-PCS | Mod: S$GLB,,, | Performed by: NURSE PRACTITIONER

## 2022-02-02 PROCEDURE — 3008F PR BODY MASS INDEX (BMI) DOCUMENTED: ICD-10-PCS | Mod: S$GLB,,, | Performed by: NURSE PRACTITIONER

## 2022-02-02 PROCEDURE — 3075F PR MOST RECENT SYSTOLIC BLOOD PRESS GE 130-139MM HG: ICD-10-PCS | Mod: S$GLB,,, | Performed by: NURSE PRACTITIONER

## 2022-02-02 PROCEDURE — 3079F PR MOST RECENT DIASTOLIC BLOOD PRESSURE 80-89 MM HG: ICD-10-PCS | Mod: S$GLB,,, | Performed by: NURSE PRACTITIONER

## 2022-02-02 NOTE — TELEPHONE ENCOUNTER
----- Message from Adilene Allen sent at 2/2/2022  3:28 PM CST -----  Patient wants lab orders sent to [facility] in [place] before his appointment on 03/07/2022. Patient's callback number is [phone].

## 2022-02-02 NOTE — PROGRESS NOTES
SUBJECTIVE:    Patient ID: Stewart Barreto is a 51 y.o. male.    Chief Complaint: Hematuria (1 episode last night, having some burning upon urination // had ua done this morning at urgent care and was given Cipro - they recommended he still f/u with PCP due to possibility of undetected infection ac )    Pt presents with c/o episode of blood in urine last night. No abdominal pain, no difficulty voiding, no dysuria. Does admit to some discomfort in bottom with sitting. No history of kidney stones. Pt went to  where urine was negative for blood. Was given RX for Cipro for 10 days incase he was having a UTI and advised to follow-up with PCP. States he has had no more episodes of blood in urine. 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 Does admit to what he thinks is increased voiding over the last 2 days.       Office Visit on 02/02/2022   Component Date Value Ref Range Status    POC Blood, Urine 02/02/2022 Negative  Negative Final    POC Bilirubin, Urine 02/02/2022 Negative  Negative Final    POC Urobilinogen, Urine 02/02/2022 0.2* 0.3 - 2.2 Final    POC Ketones, Urine 02/02/2022 Negative  Negative Final    POC Protein, Urine 02/02/2022 Negative  Negative Final    POC Nitrates, Urine 02/02/2022 Negative  Negative Final    POC Glucose, Urine 02/02/2022 Negative  Negative Final    pH, UA 02/02/2022 7.5   Final    POC Specific Gravity, Urine 02/02/2022 1.015  1.003 - 1.029 Final    POC Leukocytes, Urine 02/02/2022 Negative  Negative Final       No past medical history on file.  Past Surgical History:   Procedure Laterality Date    TONSILLECTOMY       Family History   Problem Relation Age of Onset    Allergic rhinitis Mother     Rhinitis Father     Asthma Father        Marital Status:   Alcohol History:  reports current alcohol use.  Tobacco History:  reports that he quit smoking about 11 years ago. He has never used smokeless tobacco.  Drug History:  has no history on file for drug  use.    Review of patient's allergies indicates:  No Known Allergies    Current Outpatient Medications:     azelastine (ASTELIN) 137 mcg (0.1 %) nasal spray, 1 spray (137 mcg total) by Nasal route 2 (two) times daily., Disp: 30 mL, Rfl: 3    budesonide (RINOCORT AQUA) 32 mcg/actuation nasal spray, 1 spray (32 mcg total) by Nasal route 2 (two) times daily. (Patient not taking: Reported on 8/25/2020), Disp: 8.6 g, Rfl: 3    colchicine (COLCRYS) 0.6 mg tablet, Take 1 tablet (0.6 mg total) by mouth daily as needed., Disp: 90 tablet, Rfl: 1    cranberry conc-ascorbic acid 4,200-20 mg Cap, Take 1 capsule by mouth., Disp: , Rfl:     docusate sodium (COLACE) 100 MG capsule, Take 100 mg by mouth 2 (two) times daily., Disp: , Rfl:     famotidine (PEPCID) 20 MG tablet, Take 20 mg by mouth., Disp: , Rfl:     immun glob G,IgG,/pro/IgA 0-50 (PRIVIGEN IV), Inject into the vein every 14 (fourteen) days., Disp: , Rfl:     melatonin 5 mg TbIE, , Disp: , Rfl:     multivitamin Tab, , Disp: , Rfl:     niacin 250 MG CpSR, , Disp: , Rfl:     omega 3-dha-epa-fish oil (FISH OIL) 1,000 mg (120 mg-180 mg) Cap, Take by mouth., Disp: , Rfl:     temazepam (RESTORIL) 15 mg Cap, Take 1 capsule (15 mg total) by mouth every evening., Disp: 90 capsule, Rfl: 1    Review of Systems   Constitutional: Negative for activity change, fatigue and fever.   HENT: Negative.    Respiratory: Negative.    Cardiovascular: Negative.    Genitourinary: Positive for frequency and hematuria. Negative for decreased urine volume, dysuria and urgency.   Neurological: Negative.    Psychiatric/Behavioral: Negative.           Objective:      Vitals:    02/02/22 1440   BP: 136/88   Pulse: 76   Weight: (!) 137.4 kg (303 lb)   Height: 6' (1.829 m)     Body mass index is 41.09 kg/m².  Physical Exam  Constitutional:       Appearance: Normal appearance.   HENT:      Head: Normocephalic and atraumatic.   Cardiovascular:      Pulses: Normal pulses.   Pulmonary:       Effort: No respiratory distress.   Neurological:      Mental Status: He is alert and oriented to person, place, and time.   Psychiatric:         Mood and Affect: Mood normal.           Assessment:       1. Gross hematuria    2. Acute cystitis with hematuria         Plan:       Gross hematuria  -     POCT Urinalysis, Dipstick, Automated, W/O Scope  -     Urine culture; Future; Expected date: 02/02/2022    Acute cystitis with hematuria    Agree with treatment for UTI vs Prostatitis. Also consider kidney stone. Will send urine for culture. Continue Cipro. If occurs again consider further workup.    Follow up if symptoms worsen or fail to improve.

## 2022-02-02 NOTE — TELEPHONE ENCOUNTER
----- Message from Gracie Marks sent at 2/2/2022 10:35 AM CST -----  Pt called said he has blood in his urine and some discomfort with urination he went to urgent care this morning and was tested for UTI and was negative told to see PCP. He is coming in today @ 2:30

## 2022-02-04 ENCOUNTER — PATIENT MESSAGE (OUTPATIENT)
Dept: FAMILY MEDICINE | Facility: CLINIC | Age: 52
End: 2022-02-04
Payer: COMMERCIAL

## 2022-02-04 LAB — BACTERIA UR CULT: NORMAL

## 2022-02-28 ENCOUNTER — TELEPHONE (OUTPATIENT)
Dept: FAMILY MEDICINE | Facility: CLINIC | Age: 52
End: 2022-02-28
Payer: COMMERCIAL

## 2022-02-28 NOTE — TELEPHONE ENCOUNTER
----- Message from Kathi Fierro sent at 2/28/2022  3:01 PM CST -----  Please fax lab orders to [facility] @ [phone]

## 2022-03-07 ENCOUNTER — OFFICE VISIT (OUTPATIENT)
Dept: FAMILY MEDICINE | Facility: CLINIC | Age: 52
End: 2022-03-07
Payer: COMMERCIAL

## 2022-03-07 VITALS
SYSTOLIC BLOOD PRESSURE: 128 MMHG | HEART RATE: 88 BPM | WEIGHT: 297 LBS | HEIGHT: 72 IN | BODY MASS INDEX: 40.23 KG/M2 | DIASTOLIC BLOOD PRESSURE: 86 MMHG

## 2022-03-07 DIAGNOSIS — F41.9 ANXIETY: ICD-10-CM

## 2022-03-07 DIAGNOSIS — Z00.00 WELLNESS EXAMINATION: Primary | ICD-10-CM

## 2022-03-07 DIAGNOSIS — F51.01 PRIMARY INSOMNIA: ICD-10-CM

## 2022-03-07 DIAGNOSIS — M1A.0790 IDIOPATHIC CHRONIC GOUT OF FOOT WITHOUT TOPHUS, UNSPECIFIED LATERALITY: ICD-10-CM

## 2022-03-07 DIAGNOSIS — E66.01 MORBID OBESITY WITH BMI OF 40.0-44.9, ADULT: ICD-10-CM

## 2022-03-07 DIAGNOSIS — J31.0 CHRONIC RHINITIS: ICD-10-CM

## 2022-03-07 DIAGNOSIS — Z12.11 SPECIAL SCREENING FOR MALIGNANT NEOPLASMS, COLON: ICD-10-CM

## 2022-03-07 DIAGNOSIS — G61.82 MULTIFOCAL MOTOR NEUROPATHY: ICD-10-CM

## 2022-03-07 PROCEDURE — 99396 PREV VISIT EST AGE 40-64: CPT | Mod: S$GLB,,, | Performed by: FAMILY MEDICINE

## 2022-03-07 PROCEDURE — 3008F BODY MASS INDEX DOCD: CPT | Mod: S$GLB,,, | Performed by: FAMILY MEDICINE

## 2022-03-07 PROCEDURE — 3074F SYST BP LT 130 MM HG: CPT | Mod: S$GLB,,, | Performed by: FAMILY MEDICINE

## 2022-03-07 PROCEDURE — 99396 PR PREVENTIVE VISIT,EST,40-64: ICD-10-PCS | Mod: S$GLB,,, | Performed by: FAMILY MEDICINE

## 2022-03-07 PROCEDURE — 3079F DIAST BP 80-89 MM HG: CPT | Mod: S$GLB,,, | Performed by: FAMILY MEDICINE

## 2022-03-07 PROCEDURE — 3074F PR MOST RECENT SYSTOLIC BLOOD PRESSURE < 130 MM HG: ICD-10-PCS | Mod: S$GLB,,, | Performed by: FAMILY MEDICINE

## 2022-03-07 PROCEDURE — 3079F PR MOST RECENT DIASTOLIC BLOOD PRESSURE 80-89 MM HG: ICD-10-PCS | Mod: S$GLB,,, | Performed by: FAMILY MEDICINE

## 2022-03-07 PROCEDURE — 3008F PR BODY MASS INDEX (BMI) DOCUMENTED: ICD-10-PCS | Mod: S$GLB,,, | Performed by: FAMILY MEDICINE

## 2022-03-07 RX ORDER — ALPRAZOLAM 0.5 MG/1
0.5 TABLET ORAL 2 TIMES DAILY PRN
Qty: 60 TABLET | Refills: 2 | Status: SHIPPED | OUTPATIENT
Start: 2022-03-07 | End: 2022-08-01 | Stop reason: SDUPTHER

## 2022-03-07 RX ORDER — TEMAZEPAM 15 MG/1
15 CAPSULE ORAL NIGHTLY
Qty: 30 CAPSULE | Refills: 1 | Status: SHIPPED | OUTPATIENT
Start: 2022-03-07 | End: 2022-09-07 | Stop reason: SDUPTHER

## 2022-03-07 NOTE — PROGRESS NOTES
SUBJECTIVE:    Patient ID: Stewart Barreto is a 51 y.o. male.    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  He is stabilized due to IVIG infusions Q 14 days.  This is  prescribed by Dr. Roy in [facility].  He feels like he has less balance lately due to worsening neuropathy of the feet and legs.  He has wobbly when he gets out of bed.  He has had no overt falls.  He has lost 6 lb since our last visit due to eating smaller portions.  He has had no regimen of exercise in the last 3 months due to the weather.    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    Gross hematuria-1 episode; now complains of some urinary dribbling.  He is status post taking Cipro for this hematuria and so far, hematuria has not returned.    Gout-under control at this time    Complains of increased anxiety, a background tenseness pervades his work day.  Sleeps well at night.    Wants to get a colonoscopy this year.  Family history of polyps    He has quit smoking 10 years ago, drinks whiskey 2-3 drinks per night to get to sleep and to fight anxiety.      Office Visit on 02/02/2022   Component Date Value Ref Range Status    POC Blood, Urine 02/02/2022 Negative  Negative Final    POC Bilirubin, Urine 02/02/2022 Negative  Negative Final    POC Urobilinogen, Urine 02/02/2022 0.2 (A) 0.3 - 2.2 Final    POC Ketones, Urine 02/02/2022 Negative  Negative Final    POC Protein, Urine 02/02/2022 Negative  Negative Final    POC Nitrates, Urine 02/02/2022 Negative  Negative Final    POC Glucose, Urine 02/02/2022 Negative  Negative Final    pH, UA 02/02/2022 7.5   Final    POC Specific Gravity, Urine 02/02/2022 1.015  1.003 - 1.029 Final    POC Leukocytes, Urine 02/02/2022 Negative  Negative Final    Urine Culture, Routine 02/02/2022    Final       No past medical history on  file.  Social History     Socioeconomic History    Marital status:    Tobacco Use    Smoking status: Former Smoker     Quit date: 2010     Years since quittin.6    Smokeless tobacco: Never Used   Substance and Sexual Activity    Alcohol use: Yes     Comment: socially     Social Determinants of Health     Financial Resource Strain: Low Risk     Difficulty of Paying Living Expenses: Not hard at all   Food Insecurity: No Food Insecurity    Worried About Running Out of Food in the Last Year: Never true    Ran Out of Food in the Last Year: Never true   Transportation Needs: No Transportation Needs    Lack of Transportation (Medical): No    Lack of Transportation (Non-Medical): No   Physical Activity: Insufficiently Active    Days of Exercise per Week: 1 day    Minutes of Exercise per Session: 20 min   Stress: Stress Concern Present    Feeling of Stress : Rather much   Social Connections: Unknown    Frequency of Communication with Friends and Family: Twice a week    Frequency of Social Gatherings with Friends and Family: Once a week    Active Member of Clubs or Organizations: No    Attends Club or Organization Meetings: Never    Marital Status:    Housing Stability: Low Risk     Unable to Pay for Housing in the Last Year: No    Number of Places Lived in the Last Year: 1    Unstable Housing in the Last Year: No     Past Surgical History:   Procedure Laterality Date    TONSILLECTOMY       Family History   Problem Relation Age of Onset    Allergic rhinitis Mother     Rhinitis Father     Asthma Father        Review of patient's allergies indicates:  No Known Allergies    Current Outpatient Medications:     colchicine (COLCRYS) 0.6 mg tablet, Take 1 tablet (0.6 mg total) by mouth daily as needed., Disp: 90 tablet, Rfl: 1    cranberry conc-ascorbic acid 4,200-20 mg Cap, Take 1 capsule by mouth., Disp: , Rfl:     docusate sodium (COLACE) 100 MG capsule, Take 100 mg by mouth 2  (two) times daily., Disp: , Rfl:     famotidine (PEPCID) 20 MG tablet, Take 20 mg by mouth., Disp: , Rfl:     immun glob G,IgG,/pro/IgA 0-50 (PRIVIGEN IV), Inject into the vein every 14 (fourteen) days., Disp: , Rfl:     melatonin 5 mg TbIE, , Disp: , Rfl:     multivitamin Tab, , Disp: , Rfl:     niacin 250 MG CpSR, , Disp: , Rfl:     omega 3-dha-epa-fish oil 1,000 mg (120 mg-180 mg) Cap, Take by mouth., Disp: , Rfl:     ALPRAZolam (XANAX) 0.5 MG tablet, Take 1 tablet (0.5 mg total) by mouth 2 (two) times daily as needed for Anxiety., Disp: 60 tablet, Rfl: 2    temazepam (RESTORIL) 15 mg Cap, Take 1 capsule (15 mg total) by mouth every evening., Disp: 30 capsule, Rfl: 1    Review of Systems   Constitutional: Negative for appetite change, chills, fatigue, fever and unexpected weight change.   HENT: Negative for congestion, ear pain and trouble swallowing.    Eyes: Negative for pain, discharge and visual disturbance.   Respiratory: Negative for apnea, cough, shortness of breath and wheezing.    Cardiovascular: Negative for chest pain and leg swelling.   Gastrointestinal: Positive for constipation (stool softeners  needed). Negative for abdominal pain, blood in stool, diarrhea, nausea and vomiting.        Pepcid  Helps the  gerd   Endocrine: Negative for cold intolerance, heat intolerance and polydipsia.   Genitourinary: Positive for frequency. Negative for dysuria, hematuria, testicular pain and urgency.   Musculoskeletal: Positive for arthralgias (hands  and  finngers  ache ), neck pain and neck stiffness. Negative for gait problem, joint swelling and myalgias.   Neurological: Negative for dizziness, seizures and numbness.   Psychiatric/Behavioral: Positive for sleep disturbance. Negative for agitation, behavioral problems and hallucinations. The patient is nervous/anxious (drinks  whiskey to relax at  night).           Objective:      Vitals:    03/07/22 0937   BP: 128/86   Pulse: 88   Weight: 134.7 kg (297  lb)   Height: 6' (1.829 m)     Physical Exam  Vitals and nursing note reviewed.   Constitutional:       General: He is not in acute distress.     Appearance: He is well-developed. He is obese. He is not toxic-appearing.   HENT:      Head: Normocephalic and atraumatic.      Right Ear: Tympanic membrane and external ear normal.      Left Ear: Tympanic membrane and external ear normal.      Nose: Nose normal.      Mouth/Throat:      Pharynx: Oropharynx is clear.   Eyes:      Pupils: Pupils are equal, round, and reactive to light.   Neck:      Thyroid: No thyromegaly.      Vascular: No carotid bruit.   Cardiovascular:      Rate and Rhythm: Normal rate and regular rhythm.      Heart sounds: Normal heart sounds. No murmur heard.  Pulmonary:      Effort: Pulmonary effort is normal.      Breath sounds: Normal breath sounds. No wheezing or rales.   Abdominal:      General: Bowel sounds are normal. There is no distension.      Palpations: Abdomen is soft.      Tenderness: There is no abdominal tenderness.   Musculoskeletal:         General: No tenderness or deformity. Normal range of motion.      Cervical back: Normal range of motion and neck supple.      Lumbar back: Normal. No spasms.      Comments: Bends 90 degrees at  Waist, upper extremities are 5/5 strength, legs have somewhat of a wobbly gait on arising from a chair but stabilize quickly.  No overt footdrop noted   Lymphadenopathy:      Cervical: No cervical adenopathy.   Skin:     General: Skin is warm and dry.      Findings: No rash.   Neurological:      Mental Status: He is alert and oriented to person, place, and time. Mental status is at baseline.      Cranial Nerves: No cranial nerve deficit.      Coordination: Coordination normal.   Psychiatric:         Behavior: Behavior normal.         Thought Content: Thought content normal.         Judgment: Judgment normal.      Comments: Increased anxiety           Assessment:       1. Wellness examination    2. Primary  insomnia    3. Special screening for malignant neoplasms, colon    4. Anxiety    5. Multifocal motor neuropathy    6. Chronic rhinitis    7. Morbid obesity with BMI of 40.0-44.9, adult    8. Idiopathic chronic gout of foot without tophus, unspecified laterality         Plan:       Wellness examination  Will refer to Dr. Espinosa colonoscopy  Primary insomnia  -     temazepam (RESTORIL) 15 mg Cap; Take 1 capsule (15 mg total) by mouth every evening.  Dispense: 30 capsule; Refill: 1    Special screening for malignant neoplasms, colon  -     Ambulatory referral/consult to Gastroenterology; Future; Expected date: 03/14/2022  Refer to Dr. Espinosa  Anxiety  -     ALPRAZolam (XANAX) 0.5 MG tablet; Take 1 tablet (0.5 mg total) by mouth 2 (two) times daily as needed for Anxiety.  Dispense: 60 tablet; Refill: 2   Anxiety, will treat with Xanax 0.5 mg b.i.d. p.r.n.  Multifocal motor neuropathy  Continue IVIG infusions Q 14 days.  Chronic rhinitis    Morbid obesity with BMI of 40.0-44.9, adult  Continue diet efforts, restart walking for exercise, bicycle would be appropriate as well  Idiopathic chronic gout of foot without tophus, unspecified laterality  No need for colchicine at this time    Follow up in about 6 months (around 9/7/2022), or Anxiety.        3/8/2022 [place] Morris

## 2022-03-08 PROBLEM — M1A.0790 IDIOPATHIC CHRONIC GOUT OF FOOT WITHOUT TOPHUS: Status: ACTIVE | Noted: 2022-03-08

## 2022-08-01 DIAGNOSIS — F41.9 ANXIETY: ICD-10-CM

## 2022-08-02 RX ORDER — ALPRAZOLAM 0.5 MG/1
0.5 TABLET ORAL 2 TIMES DAILY PRN
Qty: 60 TABLET | Refills: 2 | Status: SHIPPED | OUTPATIENT
Start: 2022-08-02 | End: 2023-02-26 | Stop reason: SDUPTHER

## 2022-08-11 ENCOUNTER — PATIENT MESSAGE (OUTPATIENT)
Dept: FAMILY MEDICINE | Facility: CLINIC | Age: 52
End: 2022-08-11

## 2022-09-07 ENCOUNTER — OFFICE VISIT (OUTPATIENT)
Dept: FAMILY MEDICINE | Facility: CLINIC | Age: 52
End: 2022-09-07
Payer: COMMERCIAL

## 2022-09-07 VITALS
BODY MASS INDEX: 40.77 KG/M2 | HEIGHT: 72 IN | DIASTOLIC BLOOD PRESSURE: 80 MMHG | WEIGHT: 301 LBS | SYSTOLIC BLOOD PRESSURE: 120 MMHG | HEART RATE: 77 BPM

## 2022-09-07 DIAGNOSIS — M1A.0790 IDIOPATHIC CHRONIC GOUT OF FOOT WITHOUT TOPHUS, UNSPECIFIED LATERALITY: ICD-10-CM

## 2022-09-07 DIAGNOSIS — G12.20 MOTOR NEURON DISEASE: ICD-10-CM

## 2022-09-07 DIAGNOSIS — G61.82 MULTIFOCAL MOTOR NEUROPATHY: Primary | ICD-10-CM

## 2022-09-07 DIAGNOSIS — E66.01 MORBID OBESITY WITH BMI OF 40.0-44.9, ADULT: ICD-10-CM

## 2022-09-07 DIAGNOSIS — F51.01 PRIMARY INSOMNIA: ICD-10-CM

## 2022-09-07 PROCEDURE — 3008F PR BODY MASS INDEX (BMI) DOCUMENTED: ICD-10-PCS | Mod: CPTII,S$GLB,, | Performed by: FAMILY MEDICINE

## 2022-09-07 PROCEDURE — 99214 PR OFFICE/OUTPT VISIT, EST, LEVL IV, 30-39 MIN: ICD-10-PCS | Mod: S$GLB,,, | Performed by: FAMILY MEDICINE

## 2022-09-07 PROCEDURE — 3079F DIAST BP 80-89 MM HG: CPT | Mod: CPTII,S$GLB,, | Performed by: FAMILY MEDICINE

## 2022-09-07 PROCEDURE — 3008F BODY MASS INDEX DOCD: CPT | Mod: CPTII,S$GLB,, | Performed by: FAMILY MEDICINE

## 2022-09-07 PROCEDURE — 1159F MED LIST DOCD IN RCRD: CPT | Mod: CPTII,S$GLB,, | Performed by: FAMILY MEDICINE

## 2022-09-07 PROCEDURE — 3079F PR MOST RECENT DIASTOLIC BLOOD PRESSURE 80-89 MM HG: ICD-10-PCS | Mod: CPTII,S$GLB,, | Performed by: FAMILY MEDICINE

## 2022-09-07 PROCEDURE — 3074F SYST BP LT 130 MM HG: CPT | Mod: CPTII,S$GLB,, | Performed by: FAMILY MEDICINE

## 2022-09-07 PROCEDURE — 99214 OFFICE O/P EST MOD 30 MIN: CPT | Mod: S$GLB,,, | Performed by: FAMILY MEDICINE

## 2022-09-07 PROCEDURE — 1159F PR MEDICATION LIST DOCUMENTED IN MEDICAL RECORD: ICD-10-PCS | Mod: CPTII,S$GLB,, | Performed by: FAMILY MEDICINE

## 2022-09-07 PROCEDURE — 3074F PR MOST RECENT SYSTOLIC BLOOD PRESSURE < 130 MM HG: ICD-10-PCS | Mod: CPTII,S$GLB,, | Performed by: FAMILY MEDICINE

## 2022-09-07 RX ORDER — TEMAZEPAM 15 MG/1
15 CAPSULE ORAL NIGHTLY
Qty: 30 CAPSULE | Refills: 1 | Status: SHIPPED | OUTPATIENT
Start: 2022-09-07 | End: 2023-06-13 | Stop reason: SDUPTHER

## 2022-09-10 NOTE — PROGRESS NOTES
SUBJECTIVE:    Patient ID: Stewart Barreto is a 52 y.o. male.    Chief Complaint: Follow-up and Medication Refill (Went over meds verbally // upcoming Colonoscopy //abc )    52-year-old male with multi focal motor neuropathy diagnosed in .  Currently sees Dr. Roy neurology, he continues to have weakness in his hands ankles and feet.  He gets IVIG infusions  q o weekly at home with the nurse.  The fusion last about 3-4 hours.    No recent gout attacks, mild hand aching however.    Patient now you Xarelto come but bike and an iPad to exercise 15-20 minutes several times a week he would like to do in HIIT workouts.  He tries to walk 15 minutes in the neighborhood.  His diet is about the same drinking less alcohol lately.  He is using Xanax 0.5 mg HS.  Work is less stressful and has less anxiety.  Temazepam 15 mg 2 to 3 times a month home for insomnia    He is scheduled to go see Dr. Espinosa.  Colonoscopy is scheduled.      No visits with results within 6 Month(s) from this visit.   Latest known visit with results is:   Office Visit on 2022   Component Date Value Ref Range Status    POC Blood, Urine 2022 Negative  Negative Final    POC Bilirubin, Urine 2022 Negative  Negative Final    POC Urobilinogen, Urine 2022 0.2 (A)  0.3 - 2.2 Final    POC Ketones, Urine 2022 Negative  Negative Final    POC Protein, Urine 2022 Negative  Negative Final    POC Nitrates, Urine 2022 Negative  Negative Final    POC Glucose, Urine 2022 Negative  Negative Final    pH, UA 2022 7.5   Final    POC Specific Gravity, Urine 2022 1.015  1.003 - 1.029 Final    POC Leukocytes, Urine 2022 Negative  Negative Final    Urine Culture, Routine 2022    Final       No past medical history on file.  Social History     Socioeconomic History    Marital status:    Tobacco Use    Smoking status: Former     Types: Cigarettes     Quit date: 2010     Years since quittin.1     Smokeless tobacco: Never   Substance and Sexual Activity    Alcohol use: Yes     Comment: socially     Social Determinants of Health     Financial Resource Strain: Low Risk     Difficulty of Paying Living Expenses: Not hard at all   Food Insecurity: No Food Insecurity    Worried About Running Out of Food in the Last Year: Never true    Ran Out of Food in the Last Year: Never true   Transportation Needs: No Transportation Needs    Lack of Transportation (Medical): No    Lack of Transportation (Non-Medical): No   Physical Activity: Insufficiently Active    Days of Exercise per Week: 4 days    Minutes of Exercise per Session: 20 min   Stress: No Stress Concern Present    Feeling of Stress : Only a little   Social Connections: Unknown    Frequency of Communication with Friends and Family: Three times a week    Frequency of Social Gatherings with Friends and Family: Once a week    Active Member of Clubs or Organizations: No    Attends Club or Organization Meetings: Patient refused    Marital Status:    Housing Stability: Low Risk     Unable to Pay for Housing in the Last Year: No    Number of Places Lived in the Last Year: 1    Unstable Housing in the Last Year: No     Past Surgical History:   Procedure Laterality Date    TONSILLECTOMY       Family History   Problem Relation Age of Onset    Allergic rhinitis Mother     Rhinitis Father     Asthma Father        Review of patient's allergies indicates:  No Known Allergies    Current Outpatient Medications:     ALPRAZolam (XANAX) 0.5 MG tablet, Take 1 tablet (0.5 mg total) by mouth 2 (two) times daily as needed for Anxiety., Disp: 60 tablet, Rfl: 2    colchicine (COLCRYS) 0.6 mg tablet, Take 1 tablet (0.6 mg total) by mouth daily as needed., Disp: 90 tablet, Rfl: 1    cranberry conc-ascorbic acid 4,200-20 mg Cap, Take 1 capsule by mouth., Disp: , Rfl:     docusate sodium (COLACE) 100 MG capsule, Take 100 mg by mouth 2 (two) times daily., Disp: , Rfl:     famotidine  (PEPCID) 20 MG tablet, Take 20 mg by mouth., Disp: , Rfl:     immun glob G,IgG,/pro/IgA 0-50 (PRIVIGEN IV), Inject into the vein every 14 (fourteen) days., Disp: , Rfl:     melatonin 5 mg TbIE, , Disp: , Rfl:     multivitamin Tab, , Disp: , Rfl:     niacin 250 MG CpSR, , Disp: , Rfl:     omega 3-dha-epa-fish oil 1,000 mg (120 mg-180 mg) Cap, Take by mouth., Disp: , Rfl:     temazepam (RESTORIL) 15 mg Cap, Take 1 capsule (15 mg total) by mouth every evening., Disp: 30 capsule, Rfl: 1    Review of Systems   Constitutional:  Negative for appetite change, chills, fatigue, fever and unexpected weight change.   HENT:  Negative for congestion, ear pain and trouble swallowing.    Eyes:  Negative for pain, discharge and visual disturbance.   Respiratory:  Negative for apnea, cough, shortness of breath and wheezing.    Cardiovascular:  Negative for chest pain and leg swelling.   Gastrointestinal:  Negative for abdominal pain, blood in stool, constipation, diarrhea, nausea and vomiting.   Endocrine: Negative for cold intolerance, heat intolerance and polydipsia.   Genitourinary:  Negative for dysuria, hematuria, testicular pain and urgency.   Musculoskeletal:  Positive for arthralgias (hands are achy). Negative for gait problem, joint swelling and myalgias.   Neurological:  Negative for dizziness, seizures and numbness.   Psychiatric/Behavioral:  Negative for agitation, behavioral problems and hallucinations. The patient is not nervous/anxious.         Objective:      Vitals:    09/07/22 1050   BP: 120/80   Pulse: 77   Weight: (!) 136.5 kg (301 lb)   Height: 6' (1.829 m)     Physical Exam  Vitals and nursing note reviewed.   Constitutional:       General: He is not in acute distress.     Appearance: He is well-developed. He is obese. He is not toxic-appearing.   HENT:      Head: Normocephalic and atraumatic.      Right Ear: Tympanic membrane and external ear normal.      Left Ear: Tympanic membrane and external ear normal.       Nose: Nose normal.      Mouth/Throat:      Pharynx: Oropharynx is clear.   Eyes:      Pupils: Pupils are equal, round, and reactive to light.   Neck:      Thyroid: No thyromegaly.      Vascular: No carotid bruit.   Cardiovascular:      Rate and Rhythm: Normal rate and regular rhythm.      Heart sounds: Normal heart sounds. No murmur heard.  Pulmonary:      Effort: Pulmonary effort is normal.      Breath sounds: Normal breath sounds. No wheezing or rales.   Abdominal:      General: Bowel sounds are normal. There is no distension.      Palpations: Abdomen is soft.      Tenderness: There is no abdominal tenderness.   Musculoskeletal:         General: No tenderness or deformity. Normal range of motion.      Cervical back: Normal range of motion and neck supple.      Lumbar back: Normal. No spasms.      Comments: Bends 90 degrees at  waist, shoulders and knees have good range of motion, weak hand grasp noted bilaterally   Lymphadenopathy:      Cervical: No cervical adenopathy.   Skin:     General: Skin is warm and dry.      Findings: No rash.   Neurological:      Mental Status: He is alert and oriented to person, place, and time.      Cranial Nerves: No cranial nerve deficit.      Coordination: Coordination normal.   Psychiatric:         Behavior: Behavior normal.         Thought Content: Thought content normal.         Judgment: Judgment normal.         Assessment:       1. Multifocal motor neuropathy    2. Primary insomnia    3. Idiopathic chronic gout of foot without tophus, unspecified laterality    4. Motor neuron disease    5. Morbid obesity with BMI of 40.0-44.9, adult           Plan:       Multifocal motor neuropathy  Doing well with IVIG infusions every other week.  Primary insomnia  -     temazepam (RESTORIL) 15 mg Cap; Take 1 capsule (15 mg total) by mouth every evening.  Dispense: 30 capsule; Refill: 1  Continue temazepam p.r.n.  Idiopathic chronic gout of foot without tophus, unspecified laterality  No  recent gout attacks  Motor neuron disease    Morbid obesity with BMI of 40.0-44.9, adult  Continue exercise with a recumbent bike and walking in the neighborhood, labs due today  Follow up in about 6 months (around 3/7/2023), or neuropathy.        9/10/2022 [place] Morris

## 2022-10-20 ENCOUNTER — PATIENT MESSAGE (OUTPATIENT)
Dept: FAMILY MEDICINE | Facility: CLINIC | Age: 52
End: 2022-10-20

## 2023-02-26 DIAGNOSIS — F41.9 ANXIETY: ICD-10-CM

## 2023-02-27 RX ORDER — ALPRAZOLAM 0.5 MG/1
0.5 TABLET ORAL 2 TIMES DAILY PRN
Qty: 60 TABLET | Refills: 2 | Status: SHIPPED | OUTPATIENT
Start: 2023-02-27 | End: 2023-09-15 | Stop reason: SDUPTHER

## 2023-03-08 ENCOUNTER — OFFICE VISIT (OUTPATIENT)
Dept: FAMILY MEDICINE | Facility: CLINIC | Age: 53
End: 2023-03-08
Payer: COMMERCIAL

## 2023-03-08 DIAGNOSIS — Z79.899 ENCOUNTER FOR LONG-TERM (CURRENT) USE OF OTHER MEDICATIONS: ICD-10-CM

## 2023-03-08 DIAGNOSIS — M1A.0790 IDIOPATHIC CHRONIC GOUT OF FOOT WITHOUT TOPHUS, UNSPECIFIED LATERALITY: ICD-10-CM

## 2023-03-08 DIAGNOSIS — Z12.5 SPECIAL SCREENING FOR MALIGNANT NEOPLASM OF PROSTATE: ICD-10-CM

## 2023-03-08 DIAGNOSIS — F41.9 ANXIETY: ICD-10-CM

## 2023-03-08 DIAGNOSIS — E66.01 MORBID OBESITY WITH BMI OF 40.0-44.9, ADULT: ICD-10-CM

## 2023-03-08 DIAGNOSIS — F51.01 PRIMARY INSOMNIA: ICD-10-CM

## 2023-03-08 DIAGNOSIS — G61.82 MULTIFOCAL MOTOR NEUROPATHY: ICD-10-CM

## 2023-03-08 DIAGNOSIS — J31.0 CHRONIC RHINITIS: ICD-10-CM

## 2023-03-08 DIAGNOSIS — Z51.81 ENCOUNTER FOR THERAPEUTIC DRUG MONITORING: ICD-10-CM

## 2023-03-08 DIAGNOSIS — G12.20 MOTOR NEURON DISEASE: ICD-10-CM

## 2023-03-08 DIAGNOSIS — Z00.00 WELLNESS EXAMINATION: Primary | ICD-10-CM

## 2023-03-08 DIAGNOSIS — I10 PRIMARY HYPERTENSION: ICD-10-CM

## 2023-03-08 PROCEDURE — 3077F SYST BP >= 140 MM HG: CPT | Mod: CPTII,S$GLB,, | Performed by: FAMILY MEDICINE

## 2023-03-08 PROCEDURE — 3008F BODY MASS INDEX DOCD: CPT | Mod: CPTII,S$GLB,, | Performed by: FAMILY MEDICINE

## 2023-03-08 PROCEDURE — 99396 PREV VISIT EST AGE 40-64: CPT | Mod: S$GLB,,, | Performed by: FAMILY MEDICINE

## 2023-03-08 PROCEDURE — 3080F DIAST BP >= 90 MM HG: CPT | Mod: CPTII,S$GLB,, | Performed by: FAMILY MEDICINE

## 2023-03-08 PROCEDURE — 3008F PR BODY MASS INDEX (BMI) DOCUMENTED: ICD-10-PCS | Mod: CPTII,S$GLB,, | Performed by: FAMILY MEDICINE

## 2023-03-08 PROCEDURE — 99396 PR PREVENTIVE VISIT,EST,40-64: ICD-10-PCS | Mod: S$GLB,,, | Performed by: FAMILY MEDICINE

## 2023-03-08 PROCEDURE — 3080F PR MOST RECENT DIASTOLIC BLOOD PRESSURE >= 90 MM HG: ICD-10-PCS | Mod: CPTII,S$GLB,, | Performed by: FAMILY MEDICINE

## 2023-03-08 PROCEDURE — 1159F MED LIST DOCD IN RCRD: CPT | Mod: CPTII,S$GLB,, | Performed by: FAMILY MEDICINE

## 2023-03-08 PROCEDURE — 1159F PR MEDICATION LIST DOCUMENTED IN MEDICAL RECORD: ICD-10-PCS | Mod: CPTII,S$GLB,, | Performed by: FAMILY MEDICINE

## 2023-03-08 PROCEDURE — 3077F PR MOST RECENT SYSTOLIC BLOOD PRESSURE >= 140 MM HG: ICD-10-PCS | Mod: CPTII,S$GLB,, | Performed by: FAMILY MEDICINE

## 2023-03-10 LAB
1OH-MIDAZOLAM UR-MCNC: NEGATIVE NG/ML
7AMINOCLONAZEPAM UR-MCNC: NEGATIVE NG/ML
A-OH ALPRAZ UR-MCNC: 41 NG/ML
A-OH-TRIAZOLAM UR-MCNC: NEGATIVE NG/ML
AMPHETAMINES UR QL: NEGATIVE NG/ML
BARBITURATES UR QL: NEGATIVE NG/ML
BENZODIAZ UR QL: POSITIVE NG/ML
BZE UR QL: NEGATIVE NG/ML
CREAT UR-MCNC: 168.5 MG/DL
DRUG SCREEN COMMENT UR-IMP: ABNORMAL
LORAZEPAM UR-MCNC: NEGATIVE NG/ML
METHADONE UR QL: NEGATIVE NG/ML
NORDIAZEPAM UR-MCNC: NEGATIVE NG/ML
NOTES AND COMMENTS: ABNORMAL
OH-ETHYLFLURAZ UR-MCNC: NEGATIVE NG/ML
OPIATES UR QL: NEGATIVE NG/ML
OXAZEPAM UR-MCNC: NEGATIVE NG/ML
OXIDANTS UR QL: NEGATIVE MCG/ML
OXYCODONE UR QL: NEGATIVE NG/ML
PCP UR QL: NEGATIVE NG/ML
PH UR: 7.2 [PH] (ref 4.5–9)
TEMAZEPAM UR-MCNC: NEGATIVE NG/ML

## 2023-03-11 VITALS
HEIGHT: 72 IN | HEART RATE: 90 BPM | SYSTOLIC BLOOD PRESSURE: 142 MMHG | BODY MASS INDEX: 41.17 KG/M2 | DIASTOLIC BLOOD PRESSURE: 90 MMHG | WEIGHT: 304 LBS

## 2023-03-11 PROBLEM — I10 PRIMARY HYPERTENSION: Status: ACTIVE | Noted: 2023-03-11

## 2023-03-11 NOTE — PROGRESS NOTES
SUBJECTIVE:    Patient ID: Stewart Barreto is a 52 y.o. male.    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  Sees Dr. Roy in [facility] for IVIG infusions and steroids, muscle fatigue continues.  Stairs are difficult.  He has gained 3 lb lately.  Blood pressure is now running a bit high.    Sinus allergies-Dr. Ponce-currently off of Zyrtec but now using Flonase and Astelin nasal sprays.    He is a nonsmoker and he has dropped his beer drinking down to 1-2 beers a month    , patient has right knee soreness because he fell and twisted that knee.  He uses a recumbent bike and some swimming for exercise.  Currently takes Naprosyn 500 mg as needed.    Primary insomnia-not on temazepam much.    2022-colonoscopy with Dr. Espinosa-[facility] 3 years      Office Visit on 03/08/2023   Component Date Value Ref Range Status    Amphetamines 03/08/2023 NEGATIVE  <500 ng/mL Final    Barbiturates 03/08/2023 NEGATIVE  <300 ng/mL Final    Benzodiazepines 03/08/2023 POSITIVE (A)  <100 ng/mL Final    Alphahydroxyalprazolam 03/08/2023 41 (H)  <25 ng/mL Final    Alphahydroxymidazolam 03/08/2023 NEGATIVE  <50 ng/mL Final    Alphahydroxytriazolam 03/08/2023 NEGATIVE  <50 ng/mL Final    Aminoclonazepam 03/08/2023 NEGATIVE  <25 ng/mL Final    hydroxyethylflurazepam UR GC/MS 03/08/2023 NEGATIVE  <50 ng/mL Final    Lorazepam 03/08/2023 NEGATIVE  <50 ng/mL Final    Nordiazepam Lvl 03/08/2023 NEGATIVE  <50 ng/mL Final    Oxazepam 03/08/2023 NEGATIVE  <50 ng/mL Final    Temazepam GC/MS Conf 03/08/2023 NEGATIVE  <50 ng/mL Final    Benzodiazepines Comments 03/08/2023    Final    Cocaine Metabolites 03/08/2023 NEGATIVE  <150 ng/mL Final    Methadone 03/08/2023 NEGATIVE  <100 ng/mL Final    Opiates 03/08/2023 NEGATIVE  <100 ng/mL Final    Oxycodone 03/08/2023 NEGATIVE  <100 ng/mL Final    Phencyclidine 03/08/2023 NEGATIVE  <25 ng/mL Final    Creatinine  2023 168.5  > or = 20.0 mg/dL Final    pH 2023 7.2  4.5 - 9.0 Final    Oxidants, Urine (Tox) 2023 NEGATIVE  <200 mcg/mL Final    Notes and Comments 2023    Final       No past medical history on file.  Social History     Socioeconomic History    Marital status:    Tobacco Use    Smoking status: Former     Types: Cigarettes     Quit date: 2010     Years since quittin.6    Smokeless tobacco: Never   Substance and Sexual Activity    Alcohol use: Yes     Comment: socially     Social Determinants of Health     Financial Resource Strain: Low Risk     Difficulty of Paying Living Expenses: Not hard at all   Food Insecurity: No Food Insecurity    Worried About Running Out of Food in the Last Year: Never true    Ran Out of Food in the Last Year: Never true   Transportation Needs: No Transportation Needs    Lack of Transportation (Medical): No    Lack of Transportation (Non-Medical): No   Physical Activity: Insufficiently Active    Days of Exercise per Week: 5 days    Minutes of Exercise per Session: 20 min   Stress: Stress Concern Present    Feeling of Stress : To some extent   Social Connections: Unknown    Frequency of Communication with Friends and Family: Once a week    Frequency of Social Gatherings with Friends and Family: Once a week    Active Member of Clubs or Organizations: Yes    Attends Club or Organization Meetings: More than 4 times per year    Marital Status:    Housing Stability: Low Risk     Unable to Pay for Housing in the Last Year: No    Number of Places Lived in the Last Year: 1    Unstable Housing in the Last Year: No     Past Surgical History:   Procedure Laterality Date    TONSILLECTOMY       Family History   Problem Relation Age of Onset    Allergic rhinitis Mother     Rhinitis Father     Asthma Father        Review of patient's allergies indicates:  No Known Allergies    Current Outpatient Medications:     ALPRAZolam (XANAX) 0.5 MG tablet, Take 1 tablet  (0.5 mg total) by mouth 2 (two) times daily as needed for Anxiety., Disp: 60 tablet, Rfl: 2    colchicine (COLCRYS) 0.6 mg tablet, Take 1 tablet (0.6 mg total) by mouth daily as needed., Disp: 90 tablet, Rfl: 1    cranberry conc-ascorbic acid 4,200-20 mg Cap, Take 1 capsule by mouth., Disp: , Rfl:     docusate sodium (COLACE) 100 MG capsule, Take 100 mg by mouth 2 (two) times daily., Disp: , Rfl:     famotidine (PEPCID) 20 MG tablet, Take 20 mg by mouth., Disp: , Rfl:     immun glob G,IgG,/pro/IgA 0-50 (PRIVIGEN IV), Inject into the vein every 14 (fourteen) days., Disp: , Rfl:     melatonin 5 mg TbIE, , Disp: , Rfl:     multivitamin Tab, , Disp: , Rfl:     niacin 250 MG CpSR, , Disp: , Rfl:     omega 3-dha-epa-fish oil 1,000 mg (120 mg-180 mg) Cap, Take by mouth., Disp: , Rfl:     temazepam (RESTORIL) 15 mg Cap, Take 1 capsule (15 mg total) by mouth every evening., Disp: 30 capsule, Rfl: 1    Review of Systems   Constitutional:  Positive for fatigue. Negative for appetite change, chills, fever and unexpected weight change.   HENT:  Positive for congestion. Negative for ear pain and trouble swallowing.    Eyes:  Negative for pain, discharge and visual disturbance.   Respiratory:  Negative for apnea, cough, shortness of breath and wheezing.    Cardiovascular:  Negative for chest pain and leg swelling.   Gastrointestinal:  Negative for abdominal pain, blood in stool, constipation, diarrhea, nausea and vomiting.   Endocrine: Negative for cold intolerance, heat intolerance and polydipsia.   Genitourinary:  Negative for dysuria, hematuria, testicular pain and urgency.   Musculoskeletal:  Positive for arthralgias (right knee discomfort). Negative for gait problem, joint swelling and myalgias.   Neurological:  Positive for weakness. Negative for dizziness, seizures and numbness.   Psychiatric/Behavioral:  Negative for agitation, behavioral problems and hallucinations. The patient is not nervous/anxious.         Objective:       Vitals:    03/08/23 1048 03/08/23 1055 03/11/23 1701   BP: (!) 170/100 (!) 156/94 (!) 142/90   Pulse: 90     Weight: (!) 137.9 kg (304 lb)     Height: 6' (1.829 m)       Physical Exam  Vitals and nursing note reviewed.   Constitutional:       General: He is not in acute distress.     Appearance: He is well-developed. He is obese. He is not toxic-appearing.   HENT:      Head: Normocephalic and atraumatic.      Right Ear: Tympanic membrane and external ear normal.      Left Ear: Tympanic membrane and external ear normal.      Nose: Nose normal.      Mouth/Throat:      Pharynx: Oropharynx is clear.   Eyes:      Pupils: Pupils are equal, round, and reactive to light.   Neck:      Thyroid: No thyromegaly.      Vascular: No carotid bruit.   Cardiovascular:      Rate and Rhythm: Normal rate and regular rhythm.      Heart sounds: Normal heart sounds. No murmur heard.  Pulmonary:      Effort: Pulmonary effort is normal.      Breath sounds: Normal breath sounds. No wheezing or rales.   Abdominal:      General: Bowel sounds are normal. There is no distension.      Palpations: Abdomen is soft.      Tenderness: There is no abdominal tenderness.   Musculoskeletal:         General: No tenderness or deformity. Normal range of motion.      Cervical back: Normal range of motion and neck supple.      Lumbar back: Normal. No spasms.      Comments: Bends 90 degrees at  waist, hand  are somewhat weak.  Proximal muscles are somewhat weak.  No pitting edema to lower extremities   Lymphadenopathy:      Cervical: No cervical adenopathy.   Skin:     General: Skin is warm and dry.      Findings: No rash.   Neurological:      Mental Status: He is alert and oriented to person, place, and time.      Cranial Nerves: No cranial nerve deficit.      Motor: Weakness present.      Coordination: Coordination normal.   Psychiatric:         Behavior: Behavior normal.         Thought Content: Thought content normal.         Judgment: Judgment  normal.         Assessment:       1. Wellness examination    2. Idiopathic chronic gout of foot without tophus, unspecified laterality    3. Primary insomnia    4. Encounter for therapeutic drug monitoring    5. Encounter for long-term (current) use of other medications    6. Anxiety    7. Multifocal motor neuropathy    8. Primary hypertension    9. Special screening for malignant neoplasm of prostate    10. Motor neuron disease    11. Chronic rhinitis    12. Morbid obesity with BMI of 40.0-44.9, adult           Plan:       Multifocal motor neuropathy  Continue IVIG infusions with Dr. Roy in [facility] [place].  Idiopathic chronic gout of foot without tophus, unspecified laterality  No recent gout attacks  Primary insomnia  Rarely uses temazepam  Encounter for therapeutic drug monitoring  -     Pain Managment Profile 4, w/ Confirm, Ur; Future; Expected date: 03/08/2023  Urine drug screen today  Encounter for long-term (current) use of other medications  -     Pain Managment Profile 4, w/ Confirm, Ur; Future; Expected date: 03/08/2023    Anxiety  -     Pain Managment Profile 4, w/ Confirm, Ur; Future; Expected date: 03/08/2023    Primary hypertension  -     CBC Auto Differential; Future; Expected date: 03/08/2023  -     Comprehensive Metabolic Panel; Future; Expected date: 03/08/2023  -     Lipid Panel; Future; Expected date: 03/08/2023  -     TSH w/reflex to FT4; Future; Expected date: 03/08/2023  Blood pressure mildly elevated 142/90, recommend weight loss, low-sodium diet to reduce his blood pressure.  Special screening for malignant neoplasm of prostate  -     PSA, Screening; Future; Expected date: 03/08/2023    Motor neuron disease    Chronic rhinitis    Morbid obesity with BMI of 40.0-44.9, adult    Other orders  -     DRUG MONITOR, PANEL 4, W/CONF, URINE      Follow up in about 6 months (around 9/8/2023), or Hypertension.        3/11/2023 [place] Morris

## 2023-06-12 NOTE — PROGRESS NOTES
Call patient.  Your urine drug screen shows you are  compliant in taking medication that was prescribed to you.  No problems found.  Continue current medication. Patient left writer a voicemail, stating that she received the intro letter, and requested writer for a phone call back for the [facility] intro.

## 2023-06-13 DIAGNOSIS — F51.01 PRIMARY INSOMNIA: ICD-10-CM

## 2023-06-14 RX ORDER — TEMAZEPAM 15 MG/1
15 CAPSULE ORAL NIGHTLY
Qty: 30 CAPSULE | Refills: 4 | Status: SHIPPED | OUTPATIENT
Start: 2023-06-14 | End: 2023-09-15 | Stop reason: SDUPTHER

## 2023-08-31 ENCOUNTER — TELEPHONE (OUTPATIENT)
Dept: FAMILY MEDICINE | Facility: CLINIC | Age: 53
End: 2023-08-31

## 2023-09-12 LAB
ALBUMIN SERPL-MCNC: 4 G/DL (ref 3.6–5.1)
ALBUMIN/GLOB SERPL: 0.7 (CALC) (ref 1–2.5)
ALP SERPL-CCNC: 35 U/L (ref 35–144)
ALT SERPL-CCNC: 30 U/L (ref 9–46)
AST SERPL-CCNC: 29 U/L (ref 10–35)
BASOPHILS # BLD AUTO: 48 CELLS/UL (ref 0–200)
BASOPHILS NFR BLD AUTO: 1.3 %
BILIRUB SERPL-MCNC: 0.7 MG/DL (ref 0.2–1.2)
BUN SERPL-MCNC: 17 MG/DL (ref 7–25)
BUN/CREAT SERPL: ABNORMAL (CALC) (ref 6–22)
CALCIUM SERPL-MCNC: 9.7 MG/DL (ref 8.6–10.3)
CHLORIDE SERPL-SCNC: 100 MMOL/L (ref 98–110)
CHOLEST SERPL-MCNC: 194 MG/DL
CHOLEST/HDLC SERPL: 4.5 (CALC)
CO2 SERPL-SCNC: 29 MMOL/L (ref 20–32)
CREAT SERPL-MCNC: 0.88 MG/DL (ref 0.7–1.3)
EGFR: 104 ML/MIN/1.73M2
EOSINOPHIL # BLD AUTO: 141 CELLS/UL (ref 15–500)
EOSINOPHIL NFR BLD AUTO: 3.8 %
ERYTHROCYTE [DISTWIDTH] IN BLOOD BY AUTOMATED COUNT: 13.3 % (ref 11–15)
GLOBULIN SER CALC-MCNC: 5.4 G/DL (CALC) (ref 1.9–3.7)
GLUCOSE SERPL-MCNC: 108 MG/DL (ref 65–99)
HCT VFR BLD AUTO: 44 % (ref 38.5–50)
HDLC SERPL-MCNC: 43 MG/DL
HGB BLD-MCNC: 14.6 G/DL (ref 13.2–17.1)
LDLC SERPL CALC-MCNC: 119 MG/DL (CALC)
LYMPHOCYTES # BLD AUTO: 1058 CELLS/UL (ref 850–3900)
LYMPHOCYTES NFR BLD AUTO: 28.6 %
MCH RBC QN AUTO: 32.4 PG (ref 27–33)
MCHC RBC AUTO-ENTMCNC: 33.2 G/DL (ref 32–36)
MCV RBC AUTO: 97.6 FL (ref 80–100)
MONOCYTES # BLD AUTO: 459 CELLS/UL (ref 200–950)
MONOCYTES NFR BLD AUTO: 12.4 %
NEUTROPHILS # BLD AUTO: 1994 CELLS/UL (ref 1500–7800)
NEUTROPHILS NFR BLD AUTO: 53.9 %
NONHDLC SERPL-MCNC: 151 MG/DL (CALC)
PLATELET # BLD AUTO: 215 THOUSAND/UL (ref 140–400)
PMV BLD REES-ECKER: 10.2 FL (ref 7.5–12.5)
POTASSIUM SERPL-SCNC: 4.4 MMOL/L (ref 3.5–5.3)
PROT SERPL-MCNC: 9.4 G/DL (ref 6.1–8.1)
PSA SERPL-MCNC: 0.18 NG/ML
RBC # BLD AUTO: 4.51 MILLION/UL (ref 4.2–5.8)
SODIUM SERPL-SCNC: 138 MMOL/L (ref 135–146)
TRIGL SERPL-MCNC: 196 MG/DL
TSH SERPL-ACNC: 2.58 MIU/L (ref 0.4–4.5)
WBC # BLD AUTO: 3.7 THOUSAND/UL (ref 3.8–10.8)

## 2023-09-15 ENCOUNTER — OFFICE VISIT (OUTPATIENT)
Dept: FAMILY MEDICINE | Facility: CLINIC | Age: 53
End: 2023-09-15
Attending: FAMILY MEDICINE
Payer: COMMERCIAL

## 2023-09-15 VITALS
HEART RATE: 98 BPM | BODY MASS INDEX: 40.63 KG/M2 | WEIGHT: 300 LBS | HEIGHT: 72 IN | SYSTOLIC BLOOD PRESSURE: 134 MMHG | DIASTOLIC BLOOD PRESSURE: 88 MMHG

## 2023-09-15 DIAGNOSIS — F41.9 ANXIETY: ICD-10-CM

## 2023-09-15 DIAGNOSIS — J31.0 CHRONIC RHINITIS: ICD-10-CM

## 2023-09-15 DIAGNOSIS — E66.01 MORBID OBESITY WITH BMI OF 40.0-44.9, ADULT: ICD-10-CM

## 2023-09-15 DIAGNOSIS — I10 PRIMARY HYPERTENSION: ICD-10-CM

## 2023-09-15 DIAGNOSIS — G12.20 MOTOR NEURON DISEASE: ICD-10-CM

## 2023-09-15 DIAGNOSIS — M1A.0790 IDIOPATHIC CHRONIC GOUT OF FOOT WITHOUT TOPHUS, UNSPECIFIED LATERALITY: ICD-10-CM

## 2023-09-15 DIAGNOSIS — F51.01 PRIMARY INSOMNIA: ICD-10-CM

## 2023-09-15 DIAGNOSIS — G61.82 MULTIFOCAL MOTOR NEUROPATHY: Primary | ICD-10-CM

## 2023-09-15 PROCEDURE — 3008F PR BODY MASS INDEX (BMI) DOCUMENTED: ICD-10-PCS | Mod: CPTII,S$GLB,, | Performed by: FAMILY MEDICINE

## 2023-09-15 PROCEDURE — 3079F DIAST BP 80-89 MM HG: CPT | Mod: CPTII,S$GLB,, | Performed by: FAMILY MEDICINE

## 2023-09-15 PROCEDURE — 3079F PR MOST RECENT DIASTOLIC BLOOD PRESSURE 80-89 MM HG: ICD-10-PCS | Mod: CPTII,S$GLB,, | Performed by: FAMILY MEDICINE

## 2023-09-15 PROCEDURE — 1159F MED LIST DOCD IN RCRD: CPT | Mod: CPTII,S$GLB,, | Performed by: FAMILY MEDICINE

## 2023-09-15 PROCEDURE — 3075F SYST BP GE 130 - 139MM HG: CPT | Mod: CPTII,S$GLB,, | Performed by: FAMILY MEDICINE

## 2023-09-15 PROCEDURE — 1159F PR MEDICATION LIST DOCUMENTED IN MEDICAL RECORD: ICD-10-PCS | Mod: CPTII,S$GLB,, | Performed by: FAMILY MEDICINE

## 2023-09-15 PROCEDURE — 3008F BODY MASS INDEX DOCD: CPT | Mod: CPTII,S$GLB,, | Performed by: FAMILY MEDICINE

## 2023-09-15 PROCEDURE — 3075F PR MOST RECENT SYSTOLIC BLOOD PRESS GE 130-139MM HG: ICD-10-PCS | Mod: CPTII,S$GLB,, | Performed by: FAMILY MEDICINE

## 2023-09-15 PROCEDURE — 99214 OFFICE O/P EST MOD 30 MIN: CPT | Mod: S$GLB,,, | Performed by: FAMILY MEDICINE

## 2023-09-15 PROCEDURE — 99214 PR OFFICE/OUTPT VISIT, EST, LEVL IV, 30-39 MIN: ICD-10-PCS | Mod: S$GLB,,, | Performed by: FAMILY MEDICINE

## 2023-09-15 RX ORDER — TEMAZEPAM 15 MG/1
15 CAPSULE ORAL NIGHTLY
Qty: 30 CAPSULE | Refills: 4 | Status: SHIPPED | OUTPATIENT
Start: 2023-09-15

## 2023-09-15 RX ORDER — ALPRAZOLAM 0.5 MG/1
0.5 TABLET ORAL 2 TIMES DAILY PRN
Qty: 60 TABLET | Refills: 4 | Status: SHIPPED | OUTPATIENT
Start: 2023-09-15 | End: 2023-10-15

## 2023-09-15 NOTE — PROGRESS NOTES
SUBJECTIVE:    Patient ID: Stewart Barreto is a 53 y.o. male.    Chief Complaint: Follow-up (Went over meds verbally, need refill, no complaints,brought BP machine to equilibrate, abc )    53-year-old male with multifocal motor neuropathy here for six-month checkup.  Due to oscar problems in his home, he is now moved next door to his parent's house while they repaired the sub floor in his old home.  This has caused increased amount of stress and has thrown him off his schedule of exercise and diet.  He has lost 4 lb but is no longer exercising over the summer.  Did not like the heat of this hot summer.  Still gets IVIG infusions every 2 weeks.  Dr. Roy neurology in [facility] is controlling this.  He has some worsening of the right footdrop last week and his hands seen somewhat weaker as well.    He does not drink beer anymore he has decreased his whiskey intake, he is a nonsmoker.    Unable to swim or bike this summer.    No gout flare ups lately no colchicine used.    Insomnia, alternates between temazepam and Xanax 0.5 mg HS    Had 2 Shingrix shots this year.  His neurologist feels he should get his immunizations on schedule.    2022-colonoscopy with Dr. Espinosa-[facility] 3 years        No visits with results within 6 Month(s) from this visit.   Latest known visit with results is:   Office Visit on 03/08/2023   Component Date Value Ref Range Status    WBC 09/11/2023 3.7 (L)  3.8 - 10.8 Thousand/uL Final    RBC 09/11/2023 4.51  4.20 - 5.80 Million/uL Final    Hemoglobin 09/11/2023 14.6  13.2 - 17.1 g/dL Final    Hematocrit 09/11/2023 44.0  38.5 - 50.0 % Final    MCV 09/11/2023 97.6  80.0 - 100.0 fL Final    MCH 09/11/2023 32.4  27.0 - 33.0 pg Final    MCHC 09/11/2023 33.2  32.0 - 36.0 g/dL Final    RDW 09/11/2023 13.3  11.0 - 15.0 % Final    Platelets 09/11/2023 215  140 - 400 Thousand/uL Final    MPV 09/11/2023 10.2  7.5 - 12.5 fL Final    Neutrophils, Abs 09/11/2023 1,994  1,500 - 7,800 cells/uL Final     Lymph # 09/11/2023 1,058  850 - 3,900 cells/uL Final    Mono # 09/11/2023 459  200 - 950 cells/uL Final    Eos # 09/11/2023 141  15 - 500 cells/uL Final    Baso # 09/11/2023 48  0 - 200 cells/uL Final    Neutrophils Relative 09/11/2023 53.9  % Final    Lymph % 09/11/2023 28.6  % Final    Mono % 09/11/2023 12.4  % Final    Eosinophil % 09/11/2023 3.8  % Final    Basophil % 09/11/2023 1.3  % Final    Glucose 09/11/2023 108 (H)  65 - 99 mg/dL Final    BUN 09/11/2023 17  7 - 25 mg/dL Final    Creatinine 09/11/2023 0.88  0.70 - 1.30 mg/dL Final    eGFR 09/11/2023 104  > OR = 60 mL/min/1.73m2 Final    BUN/Creatinine Ratio 09/11/2023 SEE NOTE:  6 - 22 (calc) Final    Sodium 09/11/2023 138  135 - 146 mmol/L Final    Potassium 09/11/2023 4.4  3.5 - 5.3 mmol/L Final    Chloride 09/11/2023 100  98 - 110 mmol/L Final    CO2 09/11/2023 29  20 - 32 mmol/L Final    Calcium 09/11/2023 9.7  8.6 - 10.3 mg/dL Final    Total Protein 09/11/2023 9.4 (H)  6.1 - 8.1 g/dL Final    Albumin 09/11/2023 4.0  3.6 - 5.1 g/dL Final    Globulin, Total 09/11/2023 5.4 (H)  1.9 - 3.7 g/dL (calc) Final    Albumin/Globulin Ratio 09/11/2023 0.7 (L)  1.0 - 2.5 (calc) Final    Total Bilirubin 09/11/2023 0.7  0.2 - 1.2 mg/dL Final    Alkaline Phosphatase 09/11/2023 35  35 - 144 U/L Final    AST 09/11/2023 29  10 - 35 U/L Final    ALT 09/11/2023 30  9 - 46 U/L Final    Cholesterol 09/11/2023 194  <200 mg/dL Final    HDL 09/11/2023 43  > OR = 40 mg/dL Final    Triglycerides 09/11/2023 196 (H)  <150 mg/dL Final    LDL Cholesterol 09/11/2023 119 (H)  mg/dL (calc) Final    HDL/Cholesterol Ratio 09/11/2023 4.5  <5.0 (calc) Final    Non HDL Chol. (LDL+VLDL) 09/11/2023 151 (H)  <130 mg/dL (calc) Final    TSH w/reflex to FT4 09/11/2023 2.58  0.40 - 4.50 mIU/L Final    PROSTATE SPECIFIC ANTIGEN, SCR - Q* 09/11/2023 0.18  < OR = 4.00 ng/mL Final    Amphetamines 03/08/2023 NEGATIVE  <500 ng/mL Final    Barbiturates 03/08/2023 NEGATIVE  <300 ng/mL Final     Benzodiazepines 2023 POSITIVE (A)  <100 ng/mL Final    Alphahydroxyalprazolam 2023 41 (H)  <25 ng/mL Final    Alphahydroxymidazolam 2023 NEGATIVE  <50 ng/mL Final    Alphahydroxytriazolam 2023 NEGATIVE  <50 ng/mL Final    Aminoclonazepam 2023 NEGATIVE  <25 ng/mL Final    hydroxyethylflurazepam UR GC/MS 2023 NEGATIVE  <50 ng/mL Final    Lorazepam 2023 NEGATIVE  <50 ng/mL Final    Nordiazepam Lvl 2023 NEGATIVE  <50 ng/mL Final    Oxazepam 2023 NEGATIVE  <50 ng/mL Final    Temazepam GC/MS Conf 2023 NEGATIVE  <50 ng/mL Final    Benzodiazepines Comments 2023    Final    Cocaine Metabolites 2023 NEGATIVE  <150 ng/mL Final    Methadone 2023 NEGATIVE  <100 ng/mL Final    Opiates 2023 NEGATIVE  <100 ng/mL Final    Oxycodone 2023 NEGATIVE  <100 ng/mL Final    Phencyclidine 2023 NEGATIVE  <25 ng/mL Final    Creatinine 2023 168.5  > or = 20.0 mg/dL Final    pH 2023 7.2  4.5 - 9.0 Final    Oxidants, Urine (Tox) 2023 NEGATIVE  <200 mcg/mL Final    Notes and Comments 2023    Final       No past medical history on file.  Social History     Socioeconomic History    Marital status:    Tobacco Use    Smoking status: Former     Current packs/day: 0.00     Types: Cigarettes     Quit date: 2010     Years since quittin.1    Smokeless tobacco: Never   Substance and Sexual Activity    Alcohol use: Yes     Comment: socially     Social Determinants of Health     Financial Resource Strain: Low Risk  (2023)    Overall Financial Resource Strain (CARDIA)     Difficulty of Paying Living Expenses: Not hard at all   Food Insecurity: No Food Insecurity (2023)    Hunger Vital Sign     Worried About Running Out of Food in the Last Year: Never true     Ran Out of Food in the Last Year: Never true   Transportation Needs: No Transportation Needs (2023)    PRAPARE - Transportation     Lack of Transportation  (Medical): No     Lack of Transportation (Non-Medical): No   Physical Activity: Insufficiently Active (9/12/2023)    Exercise Vital Sign     Days of Exercise per Week: 1 day     Minutes of Exercise per Session: 30 min   Stress: Stress Concern Present (9/12/2023)    [employer] [place] [employer] - Occupational Stress Questionnaire     Feeling of Stress : To some extent   Social Connections: Unknown (9/12/2023)    Social Connection and Isolation Panel [NHANES]     Frequency of Communication with Friends and Family: Twice a week     Frequency of Social Gatherings with Friends and Family: Once a week     Active Member of Clubs or Organizations: Yes     Attends Club or Organization Meetings: More than 4 times per year     Marital Status:    Housing Stability: Low Risk  (9/12/2023)    Housing Stability Vital Sign     Unable to Pay for Housing in the Last Year: No     Number of Places Lived in the Last Year: 1     Unstable Housing in the Last Year: No     Past Surgical History:   Procedure Laterality Date    TONSILLECTOMY       Family History   Problem Relation Age of Onset    Allergic rhinitis Mother     Rhinitis Father     Asthma Father        The 10-year CVD risk score (Bryan et al., 2008) is: 10.4%    Values used to calculate the score:      Age: 53 years      Sex: Male      Diabetic: No      Tobacco smoker: No      Systolic Blood Pressure: 134 mmHg      Is BP treated: No      HDL Cholesterol: 43 mg/dL      Total Cholesterol: 194 mg/dL    Tests to Keep You Healthy    Colon Cancer Screening: Met on 10/3/2022  Last Blood Pressure <= 139/89 (9/15/2023): NO      Review of patient's allergies indicates:  No Known Allergies    Current Outpatient Medications:     colchicine (COLCRYS) 0.6 mg tablet, Take 1 tablet (0.6 mg total) by mouth daily as needed., Disp: 90 tablet, Rfl: 1    cranberry conc-ascorbic acid 4,200-20 mg Cap, Take 1 capsule by mouth., Disp: , Rfl:     docusate sodium (COLACE) 100 MG  capsule, Take 100 mg by mouth 2 (two) times daily., Disp: , Rfl:     famotidine (PEPCID) 20 MG tablet, Take 20 mg by mouth., Disp: , Rfl:     immun glob G,IgG,/pro/IgA 0-50 (PRIVIGEN IV), Inject into the vein every 14 (fourteen) days., Disp: , Rfl:     melatonin 5 mg TbIE, , Disp: , Rfl:     multivitamin Tab, , Disp: , Rfl:     niacin 250 MG CpSR, , Disp: , Rfl:     omega 3-dha-epa-fish oil 1,000 mg (120 mg-180 mg) Cap, Take by mouth., Disp: , Rfl:     ALPRAZolam (XANAX) 0.5 MG tablet, Take 1 tablet (0.5 mg total) by mouth 2 (two) times daily as needed for Anxiety., Disp: 60 tablet, Rfl: 4    temazepam (RESTORIL) 15 mg Cap, Take 1 capsule (15 mg total) by mouth every evening., Disp: 30 capsule, Rfl: 4    Review of Systems   Constitutional:  Negative for appetite change, chills, fatigue, fever and unexpected weight change.   HENT:  Negative for ear pain and trouble swallowing.    Eyes:  Negative for pain, discharge and visual disturbance.   Respiratory:  Negative for apnea, cough, shortness of breath and wheezing.    Cardiovascular:  Negative for chest pain and leg swelling.   Gastrointestinal:  Negative for abdominal pain, blood in stool, constipation, diarrhea, nausea, vomiting and reflux.   Endocrine: Negative for cold intolerance, heat intolerance and polydipsia.   Genitourinary:  Negative for bladder incontinence, dysuria, erectile dysfunction, frequency, hematuria, testicular pain and urgency.   Musculoskeletal:  Negative for gait problem, joint swelling and myalgias.   Neurological:  Positive for weakness (Mild hand weakness and right footdrop). Negative for dizziness, seizures and numbness.   Psychiatric/Behavioral:  Negative for agitation, behavioral problems and hallucinations. The patient is not nervous/anxious.            Objective:      Vitals:    09/15/23 0847   BP: 134/88   Pulse: 98   Weight: 136.1 kg (300 lb)   Height: 6' (1.829 m)     Physical Exam  Vitals and nursing note reviewed.    Constitutional:       General: He is not in acute distress.     Appearance: He is well-developed. He is obese. He is not toxic-appearing.   HENT:      Head: Normocephalic and atraumatic.      Right Ear: Tympanic membrane and external ear normal.      Left Ear: Tympanic membrane and external ear normal.      Nose: Nose normal.      Mouth/Throat:      Pharynx: Oropharynx is clear.   Eyes:      Pupils: Pupils are equal, round, and reactive to light.   Neck:      Thyroid: No thyromegaly.      Vascular: No carotid bruit.   Cardiovascular:      Rate and Rhythm: Normal rate and regular rhythm.      Heart sounds: Normal heart sounds. No murmur heard.  Pulmonary:      Effort: Pulmonary effort is normal.      Breath sounds: Normal breath sounds. No wheezing or rales.   Abdominal:      General: Bowel sounds are normal. There is no distension.      Palpations: Abdomen is soft.      Tenderness: There is no abdominal tenderness.   Musculoskeletal:         General: No tenderness or deformity. Normal range of motion.      Cervical back: Normal range of motion and neck supple.      Lumbar back: Normal. No spasms.      Comments: Bends 90 degrees at  waist, shoulders and knees good range of motion no crepitance.  No pitting edema to lower extremities   Lymphadenopathy:      Cervical: No cervical adenopathy.   Skin:     General: Skin is warm and dry.      Findings: No rash.   Neurological:      Mental Status: He is alert and oriented to person, place, and time.      Cranial Nerves: No cranial nerve deficit.      Motor: Weakness (very mild right foot drop, hands have equal strength) present.      Coordination: Coordination normal.   Psychiatric:         Mood and Affect: Mood normal.         Behavior: Behavior normal.         Thought Content: Thought content normal.         Judgment: Judgment normal.           Assessment:       1. Multifocal motor neuropathy    2. Anxiety    3. Motor neuron disease    4. Chronic rhinitis    5. Primary  hypertension    6. Morbid obesity with BMI of 40.0-44.9, adult    7. Idiopathic chronic gout of foot without tophus, unspecified laterality    8. Primary insomnia         Plan:       Multifocal motor neuropathy  Responding well to IVIG infusions q.2 weeks.  Follow-up with Dr. Roy as scheduled  Anxiety  -     ALPRAZolam (XANAX) 0.5 MG tablet; Take 1 tablet (0.5 mg total) by mouth 2 (two) times daily as needed for Anxiety.  Dispense: 60 tablet; Refill: 4  Refill alprazolam for anxiety and sleep  Motor neuron disease    Chronic rhinitis    Primary hypertension  Blood pressure well controlled  Morbid obesity with BMI of 40.0-44.9, adult  Lost 4 lb, he will now eat more vegetables and salads and resume exercise  Idiopathic chronic gout of foot without tophus, unspecified laterality  No gout recently  Primary insomnia  -     temazepam (RESTORIL) 15 mg Cap; Take 1 capsule (15 mg total) by mouth every evening.  Dispense: 30 capsule; Refill: 4      Follow up in about 6 months (around 3/15/2024).        9/15/2023 [place] Morris

## 2023-10-11 ENCOUNTER — PATIENT MESSAGE (OUTPATIENT)
Dept: FAMILY MEDICINE | Facility: CLINIC | Age: 53
End: 2023-10-11

## 2024-03-11 ENCOUNTER — TELEPHONE (OUTPATIENT)
Dept: FAMILY MEDICINE | Facility: CLINIC | Age: 54
End: 2024-03-11
Payer: COMMERCIAL

## 2024-03-19 LAB
ALBUMIN SERPL-MCNC: 4.2 G/DL (ref 3.6–5.1)
ALBUMIN/GLOB SERPL: 0.9 (CALC) (ref 1–2.5)
ALP SERPL-CCNC: 40 U/L (ref 35–144)
ALT SERPL-CCNC: 33 U/L (ref 9–46)
AST SERPL-CCNC: 30 U/L (ref 10–35)
BILIRUB SERPL-MCNC: 0.5 MG/DL (ref 0.2–1.2)
BUN SERPL-MCNC: 13 MG/DL (ref 7–25)
BUN/CREAT SERPL: ABNORMAL (CALC) (ref 6–22)
CALCIUM SERPL-MCNC: 9.9 MG/DL (ref 8.6–10.3)
CHLORIDE SERPL-SCNC: 101 MMOL/L (ref 98–110)
CHOLEST SERPL-MCNC: 201 MG/DL
CHOLEST/HDLC SERPL: 5 (CALC)
CO2 SERPL-SCNC: 31 MMOL/L (ref 20–32)
CREAT SERPL-MCNC: 0.81 MG/DL (ref 0.7–1.3)
EGFR: 107 ML/MIN/1.73M2
GLOBULIN SER CALC-MCNC: 4.6 G/DL (CALC) (ref 1.9–3.7)
GLUCOSE SERPL-MCNC: 107 MG/DL (ref 65–99)
HDLC SERPL-MCNC: 40 MG/DL
LDLC SERPL CALC-MCNC: 130 MG/DL (CALC)
NONHDLC SERPL-MCNC: 161 MG/DL (CALC)
POTASSIUM SERPL-SCNC: 4.5 MMOL/L (ref 3.5–5.3)
PROT SERPL-MCNC: 8.8 G/DL (ref 6.1–8.1)
SODIUM SERPL-SCNC: 139 MMOL/L (ref 135–146)
TRIGL SERPL-MCNC: 178 MG/DL

## 2024-03-25 ENCOUNTER — OFFICE VISIT (OUTPATIENT)
Dept: FAMILY MEDICINE | Facility: CLINIC | Age: 54
End: 2024-03-25
Attending: FAMILY MEDICINE
Payer: COMMERCIAL

## 2024-03-25 VITALS
DIASTOLIC BLOOD PRESSURE: 88 MMHG | BODY MASS INDEX: 40.63 KG/M2 | WEIGHT: 300 LBS | HEART RATE: 93 BPM | SYSTOLIC BLOOD PRESSURE: 132 MMHG | HEIGHT: 72 IN

## 2024-03-25 DIAGNOSIS — N48.6 PEYRONIE'S DISEASE: ICD-10-CM

## 2024-03-25 DIAGNOSIS — F51.01 PRIMARY INSOMNIA: ICD-10-CM

## 2024-03-25 DIAGNOSIS — J31.0 CHRONIC RHINITIS: ICD-10-CM

## 2024-03-25 DIAGNOSIS — M1A.0790 IDIOPATHIC CHRONIC GOUT OF FOOT WITHOUT TOPHUS, UNSPECIFIED LATERALITY: ICD-10-CM

## 2024-03-25 DIAGNOSIS — I10 PRIMARY HYPERTENSION: ICD-10-CM

## 2024-03-25 DIAGNOSIS — J30.1 SEASONAL ALLERGIC RHINITIS DUE TO POLLEN: ICD-10-CM

## 2024-03-25 DIAGNOSIS — G61.82 MULTIFOCAL MOTOR NEUROPATHY: Primary | ICD-10-CM

## 2024-03-25 DIAGNOSIS — E66.01 MORBID OBESITY WITH BMI OF 40.0-44.9, ADULT: ICD-10-CM

## 2024-03-25 DIAGNOSIS — G12.20 MOTOR NEURON DISEASE: ICD-10-CM

## 2024-03-25 PROCEDURE — 3079F DIAST BP 80-89 MM HG: CPT | Mod: CPTII,S$GLB,, | Performed by: FAMILY MEDICINE

## 2024-03-25 PROCEDURE — 3075F SYST BP GE 130 - 139MM HG: CPT | Mod: CPTII,S$GLB,, | Performed by: FAMILY MEDICINE

## 2024-03-25 PROCEDURE — 1159F MED LIST DOCD IN RCRD: CPT | Mod: CPTII,S$GLB,, | Performed by: FAMILY MEDICINE

## 2024-03-25 PROCEDURE — 3008F BODY MASS INDEX DOCD: CPT | Mod: CPTII,S$GLB,, | Performed by: FAMILY MEDICINE

## 2024-03-25 PROCEDURE — 99396 PREV VISIT EST AGE 40-64: CPT | Mod: S$GLB,,, | Performed by: FAMILY MEDICINE

## 2024-03-25 RX ORDER — AZELASTINE 1 MG/ML
1 SPRAY, METERED NASAL 2 TIMES DAILY
Qty: 30 ML | Refills: 5 | Status: SHIPPED | OUTPATIENT
Start: 2024-03-25 | End: 2025-03-25

## 2024-03-25 NOTE — PROGRESS NOTES
SUBJECTIVE:    Patient ID: Stewart Barreto is a 53 y.o. male.    Chief Complaint: Follow-up (No bottles, discuss about medication, infusion every 14 days privigen IV, review Lab-results, abc )    53-year-old male here for his wellness visit.  His working remotely at home 2 days every other week due to his IVIG infusions with 500 mg Solu-Medrol.  This is for his multifocal motor neuropathy treatment.  He has done well and is not losing strength in his hands or upper extremities anymore.  He has a hobby of fine motor skills with painting miniature figures    He is a nonsmoker and has recently reduced his whiskey intake.    Sinus allergies due to pollen, using Flonase and nasal saline spray, would like a refill on Astelin spray    His blood pressure rises usually after he gets 1 or 2 L of his infusion that weak but resolved during the week.  He does not take antihypertensive medications.    No gout flare ups lately.  Has colchicine 0.6 mg at home but rarely uses.    He noticed some calcified areas of the base of his penis.  Still has good erections and good urine flow.  Urethral gets some dribbles.    Insomnia, uses temazepam 15 mg only when steroid infusions are in place.        Follow-up  Pertinent negatives include no arthralgias, chest pain, headaches, joint swelling, neck pain, vomiting or weakness.       No visits with results within 6 Month(s) from this visit.   Latest known visit with results is:   Office Visit on 09/15/2023   Component Date Value Ref Range Status    Glucose 03/18/2024 107 (H)  65 - 99 mg/dL Final    BUN 03/18/2024 13  7 - 25 mg/dL Final    Creatinine 03/18/2024 0.81  0.70 - 1.30 mg/dL Final    eGFR 03/18/2024 107  > OR = 60 mL/min/1.73m2 Final    BUN/Creatinine Ratio 03/18/2024 SEE NOTE:  6 - 22 (calc) Final    Sodium 03/18/2024 139  135 - 146 mmol/L Final    Potassium 03/18/2024 4.5  3.5 - 5.3 mmol/L Final    Chloride 03/18/2024 101  98 - 110 mmol/L Final    CO2 03/18/2024 31  20 - 32 mmol/L  Final    Calcium 2024 9.9  8.6 - 10.3 mg/dL Final    Total Protein 2024 8.8 (H)  6.1 - 8.1 g/dL Final    Albumin 2024 4.2  3.6 - 5.1 g/dL Final    Globulin, Total 2024 4.6 (H)  1.9 - 3.7 g/dL (calc) Final    Albumin/Globulin Ratio 2024 0.9 (L)  1.0 - 2.5 (calc) Final    Total Bilirubin 2024 0.5  0.2 - 1.2 mg/dL Final    Alkaline Phosphatase 2024 40  35 - 144 U/L Final    AST 2024 30  10 - 35 U/L Final    ALT 2024 33  9 - 46 U/L Final    Cholesterol 2024 201 (H)  <200 mg/dL Final    HDL 2024 40  > OR = 40 mg/dL Final    Triglycerides 2024 178 (H)  <150 mg/dL Final    LDL Cholesterol 2024 130 (H)  mg/dL (calc) Final    HDL/Cholesterol Ratio 2024 5.0 (H)  <5.0 (calc) Final    Non HDL Chol. (LDL+VLDL) 2024 161 (H)  <130 mg/dL (calc) Final       No past medical history on file.  Social History     Socioeconomic History    Marital status:    Tobacco Use    Smoking status: Former     Current packs/day: 0.00     Types: Cigarettes     Quit date: 2010     Years since quittin.7    Smokeless tobacco: Never   Substance and Sexual Activity    Alcohol use: Yes     Comment: socially     Social Determinants of Health     Financial Resource Strain: Low Risk  (3/22/2024)    Overall Financial Resource Strain (CARDIA)     Difficulty of Paying Living Expenses: Not hard at all   Food Insecurity: No Food Insecurity (3/22/2024)    Hunger Vital Sign     Worried About Running Out of Food in the Last Year: Never true     Ran Out of Food in the Last Year: Never true   Transportation Needs: No Transportation Needs (3/22/2024)    PRAPARE - Transportation     Lack of Transportation (Medical): No     Lack of Transportation (Non-Medical): No   Physical Activity: Insufficiently Active (3/22/2024)    Exercise Vital Sign     Days of Exercise per Week: 3 days     Minutes of Exercise per Session: 10 min   Stress: Stress Concern Present (3/22/2024)     [facility] of Occupational Health - Occupational Stress Questionnaire     Feeling of Stress : To some extent   Social Connections: Unknown (3/22/2024)    Social Connection and Isolation Panel [NHANES]     Frequency of Communication with Friends and Family: Three times a week     Frequency of Social Gatherings with Friends and Family: Once a week     Active Member of Clubs or Organizations: No     Attends Club or Organization Meetings: Never     Marital Status:    Housing Stability: Low Risk  (3/22/2024)    Housing Stability Vital Sign     Unable to Pay for Housing in the Last Year: No     Number of Places Lived in the Last Year: 1     Unstable Housing in the Last Year: No     Past Surgical History:   Procedure Laterality Date    TONSILLECTOMY       Family History   Problem Relation Age of Onset    Allergic rhinitis Mother     Rhinitis Father     Asthma Father        The 10-year CVD risk score (Bryan, et al., 2008) is: 11.2%    Values used to calculate the score:      Age: 53 years      Sex: Male      Diabetic: No      Tobacco smoker: No      Systolic Blood Pressure: 132 mmHg      Is BP treated: No      HDL Cholesterol: 40 mg/dL      Total Cholesterol: 201 mg/dL    All of your core healthy metrics are met.      Review of patient's allergies indicates:  No Known Allergies    Current Outpatient Medications:     colchicine (COLCRYS) 0.6 mg tablet, Take 1 tablet (0.6 mg total) by mouth daily as needed., Disp: 90 tablet, Rfl: 1    docusate sodium (COLACE) 100 MG capsule, Take 100 mg by mouth 2 (two) times daily., Disp: , Rfl:     famotidine (PEPCID) 20 MG tablet, Take 20 mg by mouth., Disp: , Rfl:     immun glob G,IgG,/pro/IgA 0-50 (PRIVIGEN IV), Inject into the vein every 14 (fourteen) days., Disp: , Rfl:     melatonin 5 mg TbIE, , Disp: , Rfl:     multivitamin Tab, , Disp: , Rfl:     niacin 250 MG CpSR, , Disp: , Rfl:     omega 3-dha-epa-fish oil 1,000 mg (120 mg-180 mg) Cap, Take by mouth., Disp:  , Rfl:     temazepam (RESTORIL) 15 mg Cap, Take 1 capsule (15 mg total) by mouth every evening., Disp: 30 capsule, Rfl: 4    ALPRAZolam (XANAX) 0.5 MG tablet, Take 1 tablet (0.5 mg total) by mouth 2 (two) times daily as needed for Anxiety., Disp: 60 tablet, Rfl: 4    azelastine (ASTELIN) 137 mcg (0.1 %) nasal spray, 1 spray (137 mcg total) by Nasal route 2 (two) times daily., Disp: 30 mL, Rfl: 5    cranberry conc-ascorbic acid 4,200-20 mg Cap, Take 1 capsule by mouth., Disp: , Rfl:     Review of Systems   Constitutional:  Positive for activity change. Negative for unexpected weight change.   HENT:  Positive for rhinorrhea. Negative for hearing loss and trouble swallowing.    Eyes:  Negative for discharge and visual disturbance.   Respiratory:  Negative for chest tightness and wheezing.    Cardiovascular:  Positive for palpitations. Negative for chest pain.   Gastrointestinal:  Negative for blood in stool, constipation, diarrhea and vomiting.   Endocrine: Negative for polydipsia and polyuria.   Genitourinary:  Negative for difficulty urinating, hematuria, penile pain and urgency.   Musculoskeletal:  Negative for arthralgias, joint swelling and neck pain.   Neurological:  Negative for weakness and headaches.   Psychiatric/Behavioral:  Negative for confusion and dysphoric mood.            Objective:      Vitals:    03/25/24 1507   BP: 132/88   Pulse: 93   Weight: 136.1 kg (300 lb)   Height: 6' (1.829 m)     Physical Exam  Vitals and nursing note reviewed.   Constitutional:       General: He is not in acute distress.     Appearance: He is well-developed. He is obese. He is not toxic-appearing.   HENT:      Head: Normocephalic and atraumatic.      Right Ear: Tympanic membrane and external ear normal.      Left Ear: Tympanic membrane and external ear normal.      Nose: Nose normal.      Mouth/Throat:      Pharynx: Oropharynx is clear.   Eyes:      Pupils: Pupils are equal, round, and reactive to light.   Neck:       Thyroid: No thyromegaly.      Vascular: No carotid bruit.   Cardiovascular:      Rate and Rhythm: Normal rate and regular rhythm.      Heart sounds: Normal heart sounds. No murmur heard.  Pulmonary:      Effort: Pulmonary effort is normal.      Breath sounds: Normal breath sounds. No wheezing or rales.   Abdominal:      General: Bowel sounds are normal. There is no distension.      Palpations: Abdomen is soft.      Tenderness: There is no abdominal tenderness.   Genitourinary:     Comments: Proximal 1/3 of the dorsal penis has a calcified firm mass  Musculoskeletal:         General: No tenderness or deformity. Normal range of motion.      Cervical back: Normal range of motion and neck supple.      Lumbar back: Normal. No spasms.      Comments: Bends 90 degrees at  waist   Lymphadenopathy:      Cervical: No cervical adenopathy.   Skin:     General: Skin is warm and dry.      Findings: No rash.   Neurological:      Mental Status: He is alert and oriented to person, place, and time. Mental status is at baseline.      Cranial Nerves: No cranial nerve deficit.      Coordination: Coordination normal.   Psychiatric:         Mood and Affect: Mood normal.         Behavior: Behavior normal.         Thought Content: Thought content normal.         Judgment: Judgment normal.           Assessment:       1. Multifocal motor neuropathy    2. Seasonal allergic rhinitis due to pollen    3. Motor neuron disease    4. Chronic rhinitis    5. Primary hypertension    6. Morbid obesity with BMI of 40.0-44.9, adult    7. Idiopathic chronic gout of foot without tophus, unspecified laterality    8. Primary insomnia    9. Peyronie's disease         Plan:       Multifocal motor neuropathy  Patient has continued with infusions every other week and has maintained a good stable neurologic exam.  Seasonal allergic rhinitis due to pollen  -     azelastine (ASTELIN) 137 mcg (0.1 %) nasal spray; 1 spray (137 mcg total) by Nasal route 2 (two) times  daily.  Dispense: 30 mL; Refill: 5  Add Astelin to his normal Flonase and nasal saline  Motor neuron disease  Cholesterol 201 HDL 40   cholesterol seems stable  Chronic rhinitis    Primary hypertension  Blood pressure normal today  Morbid obesity with BMI of 40.0-44.9, adult    Idiopathic chronic gout of foot without tophus, unspecified laterality  No recent gout flare ups  Primary insomnia    Peyronie's disease  Refer to urology for further evaluation.    Follow up in about 6 months (around 9/25/2024), or Multifocal motor neuropathy.        3/25/2024 [place] Morris

## 2024-04-14 ENCOUNTER — OFFICE VISIT (OUTPATIENT)
Dept: URGENT CARE | Facility: CLINIC | Age: 54
End: 2024-04-14
Payer: COMMERCIAL

## 2024-04-14 VITALS
HEART RATE: 92 BPM | RESPIRATION RATE: 17 BRPM | HEIGHT: 72 IN | TEMPERATURE: 98 F | SYSTOLIC BLOOD PRESSURE: 123 MMHG | DIASTOLIC BLOOD PRESSURE: 81 MMHG | WEIGHT: 300 LBS | OXYGEN SATURATION: 95 % | BODY MASS INDEX: 40.63 KG/M2

## 2024-04-14 DIAGNOSIS — H93.11 TINNITUS, RIGHT EAR: ICD-10-CM

## 2024-04-14 DIAGNOSIS — H61.21 IMPACTED CERUMEN OF RIGHT EAR: Primary | ICD-10-CM

## 2024-04-14 PROCEDURE — 99214 OFFICE O/P EST MOD 30 MIN: CPT | Mod: S$GLB,,,

## 2024-04-14 NOTE — PROGRESS NOTES
Subjective:      Patient ID: Stewart Barreto is a 53 y.o. male.    Vitals:  height is 6' (1.829 m) and weight is 136.1 kg (300 lb). His oral temperature is 98.3 °F (36.8 °C). His blood pressure is 123/81 and his pulse is 92. His respiration is 17 and oxygen saturation is 95%.     Chief Complaint: Otalgia (right)    Patient presents to the clinic with right ear pain and tinnitus.     Reports right ear pain that started 10 days ago. The pain improved and now comes and goes. Also reports tinnitus and muffled hearing to right ear. He does wear ear plugs at night to sleep.     Otalgia   There is pain in the right ear. This is a new problem. The current episode started 1 to 4 weeks ago (12 days). The problem occurs constantly. The problem has been gradually worsening. There has been no fever. The pain is at a severity of 1/10. The pain is mild. Associated symptoms include coughing and hearing loss. Pertinent negatives include no abdominal pain, diarrhea, headaches, neck pain, sore throat or vomiting. He has tried nothing for the symptoms. The treatment provided no relief.       Constitution: Negative for appetite change, chills, sweating, fatigue, fever and generalized weakness.   HENT:  Positive for ear pain and hearing loss. Negative for congestion, postnasal drip, sinus pain, sinus pressure, sore throat, trouble swallowing and voice change.    Neck: Negative for neck pain, neck stiffness, painful lymph nodes and neck swelling.   Cardiovascular:  Negative for chest pain, leg swelling and palpitations.   Respiratory:  Positive for cough. Negative for chest tightness, shortness of breath and wheezing.    Gastrointestinal:  Negative for abdominal pain, nausea, vomiting, constipation and diarrhea.   Genitourinary:  Negative for dysuria, frequency, urgency and urine decreased.   Skin:  Negative for color change and pale.   Allergic/Immunologic: Negative for chronic cough.   Neurological:  Negative for dizziness, headaches,  disorientation and altered mental status.   Hematologic/Lymphatic: Negative for swollen lymph nodes.   Psychiatric/Behavioral:  Negative for altered mental status, disorientation and confusion.       Objective:     Physical Exam   Constitutional: He is oriented to person, place, and time. He appears well-developed. He is cooperative.   HENT:   Head: Normocephalic and atraumatic.   Ears:   Right Ear: Hearing and external ear normal. impacted cerumen  Left Ear: Hearing, tympanic membrane, external ear and ear canal normal.   Nose: Nose normal. No mucosal edema or nasal deformity. No epistaxis. Right sinus exhibits no maxillary sinus tenderness and no frontal sinus tenderness. Left sinus exhibits no maxillary sinus tenderness and no frontal sinus tenderness.   Mouth/Throat: Uvula is midline, oropharynx is clear and moist and mucous membranes are normal. No trismus in the jaw. Normal dentition. No uvula swelling.   Eyes: Conjunctivae and lids are normal.   Neck: Trachea normal and phonation normal.   Cardiovascular: Normal rate and normal pulses.   Pulmonary/Chest: Effort normal.   Abdominal: Normal appearance.   Musculoskeletal: Normal range of motion.         General: Normal range of motion.   Neurological: He is alert and oriented to person, place, and time. He exhibits normal muscle tone.   Skin: Skin is warm, dry and intact.   Psychiatric: His speech is normal and behavior is normal. Judgment and thought content normal.   Nursing note and vitals reviewed.      Assessment:     1. Impacted cerumen of right ear    2. Tinnitus, right ear        Plan:       Impacted cerumen of right ear  -     EAR CERUMEN REMOVAL; Future  -     Ambulatory referral/consult to ENT    Tinnitus, right ear  -     Ambulatory referral/consult to ENT    - Debrox as directed  - Unable to remove cerumen impaction in clinic  - ENT Referral placed  - Follow-up with PCP in 1-2 days.  - Return to clinic as needed.  - To ED for any new or acutely  worsening symptoms including but not limited to chest pain, palpitations, shortness of breath, or fever greater than 103° F.  Family in agreement with plan of care.     - The diagnosis, treatment plan, instructions for follow-up and reevaluation as well as ED precautions were discussed and understanding was verbalized. All questions or concerns have been addressed.

## 2024-05-28 DIAGNOSIS — F51.01 PRIMARY INSOMNIA: ICD-10-CM

## 2024-05-28 DIAGNOSIS — F41.9 ANXIETY: ICD-10-CM

## 2024-05-29 RX ORDER — ALPRAZOLAM 0.5 MG/1
0.5 TABLET ORAL 2 TIMES DAILY PRN
Qty: 60 TABLET | Refills: 4 | Status: SHIPPED | OUTPATIENT
Start: 2024-05-29 | End: 2024-06-28

## 2024-05-29 RX ORDER — TEMAZEPAM 15 MG/1
15 CAPSULE ORAL NIGHTLY
Qty: 30 CAPSULE | Refills: 4 | Status: SHIPPED | OUTPATIENT
Start: 2024-05-29

## 2024-09-19 ENCOUNTER — TELEPHONE (OUTPATIENT)
Dept: FAMILY MEDICINE | Facility: CLINIC | Age: 54
End: 2024-09-19
Payer: COMMERCIAL

## 2024-09-19 DIAGNOSIS — Z79.899 ENCOUNTER FOR LONG-TERM (CURRENT) USE OF OTHER MEDICATIONS: ICD-10-CM

## 2024-09-19 DIAGNOSIS — Z00.01 ENCOUNTER FOR GENERAL ADULT MEDICAL EXAMINATION WITH ABNORMAL FINDINGS: Primary | ICD-10-CM

## 2024-09-19 DIAGNOSIS — Z12.5 SPECIAL SCREENING FOR MALIGNANT NEOPLASM OF PROSTATE: ICD-10-CM

## 2024-09-19 DIAGNOSIS — E66.01 MORBID OBESITY WITH BMI OF 40.0-44.9, ADULT: ICD-10-CM

## 2024-09-19 DIAGNOSIS — I10 PRIMARY HYPERTENSION: ICD-10-CM

## 2024-09-23 ENCOUNTER — TELEPHONE (OUTPATIENT)
Dept: FAMILY MEDICINE | Facility: CLINIC | Age: 54
End: 2024-09-23
Payer: COMMERCIAL

## 2024-09-23 DIAGNOSIS — F51.01 PRIMARY INSOMNIA: ICD-10-CM

## 2024-09-23 DIAGNOSIS — E66.01 MORBID OBESITY WITH BMI OF 40.0-44.9, ADULT: ICD-10-CM

## 2024-09-23 DIAGNOSIS — Z00.01 ENCOUNTER FOR GENERAL ADULT MEDICAL EXAMINATION WITH ABNORMAL FINDINGS: ICD-10-CM

## 2024-09-23 DIAGNOSIS — Z79.899 ENCOUNTER FOR LONG-TERM (CURRENT) USE OF OTHER MEDICATIONS: Primary | ICD-10-CM

## 2024-09-23 DIAGNOSIS — I10 PRIMARY HYPERTENSION: ICD-10-CM

## 2024-09-23 DIAGNOSIS — Z12.5 SPECIAL SCREENING FOR MALIGNANT NEOPLASM OF PROSTATE: ICD-10-CM

## 2024-09-23 DIAGNOSIS — F41.9 ANXIETY: ICD-10-CM

## 2024-09-23 NOTE — TELEPHONE ENCOUNTER
----- Message from Preethi Orellana sent at 9/23/2024  1:49 PM CDT -----  - 1:10-haley woods with [facility] and she received a fax and it is not sure where it is supposed to go and it was cut off. Need to refax to the right place   [phone] phone

## 2024-10-01 ENCOUNTER — OFFICE VISIT (OUTPATIENT)
Dept: FAMILY MEDICINE | Facility: CLINIC | Age: 54
End: 2024-10-01
Payer: COMMERCIAL

## 2024-10-01 ENCOUNTER — PATIENT MESSAGE (OUTPATIENT)
Dept: FAMILY MEDICINE | Facility: CLINIC | Age: 54
End: 2024-10-01

## 2024-10-01 VITALS
WEIGHT: 299 LBS | DIASTOLIC BLOOD PRESSURE: 88 MMHG | SYSTOLIC BLOOD PRESSURE: 138 MMHG | OXYGEN SATURATION: 98 % | HEIGHT: 72 IN | HEART RATE: 100 BPM | BODY MASS INDEX: 40.5 KG/M2

## 2024-10-01 DIAGNOSIS — M1A.0790 IDIOPATHIC CHRONIC GOUT OF FOOT WITHOUT TOPHUS, UNSPECIFIED LATERALITY: ICD-10-CM

## 2024-10-01 DIAGNOSIS — E66.813 CLASS 3 SEVERE OBESITY WITH BODY MASS INDEX (BMI) OF 40.0 TO 44.9 IN ADULT, UNSPECIFIED OBESITY TYPE, UNSPECIFIED WHETHER SERIOUS COMORBIDITY PRESENT: Primary | ICD-10-CM

## 2024-10-01 DIAGNOSIS — E78.2 MIXED HYPERLIPIDEMIA: ICD-10-CM

## 2024-10-01 DIAGNOSIS — E66.813 CLASS 3 SEVERE OBESITY WITH BODY MASS INDEX (BMI) OF 40.0 TO 44.9 IN ADULT, UNSPECIFIED OBESITY TYPE, UNSPECIFIED WHETHER SERIOUS COMORBIDITY PRESENT: ICD-10-CM

## 2024-10-01 DIAGNOSIS — E66.01 CLASS 3 SEVERE OBESITY WITH BODY MASS INDEX (BMI) OF 40.0 TO 44.9 IN ADULT, UNSPECIFIED OBESITY TYPE, UNSPECIFIED WHETHER SERIOUS COMORBIDITY PRESENT: ICD-10-CM

## 2024-10-01 DIAGNOSIS — G12.20 MOTOR NEURON DISEASE: ICD-10-CM

## 2024-10-01 DIAGNOSIS — G61.82 MULTIFOCAL MOTOR NEUROPATHY: ICD-10-CM

## 2024-10-01 DIAGNOSIS — F51.01 PRIMARY INSOMNIA: ICD-10-CM

## 2024-10-01 DIAGNOSIS — E66.01 MORBID OBESITY WITH BMI OF 40.0-44.9, ADULT: ICD-10-CM

## 2024-10-01 DIAGNOSIS — M1A.09X0 IDIOPATHIC CHRONIC GOUT OF MULTIPLE SITES WITHOUT TOPHUS: ICD-10-CM

## 2024-10-01 DIAGNOSIS — J31.0 CHRONIC RHINITIS: ICD-10-CM

## 2024-10-01 DIAGNOSIS — E66.01 CLASS 3 SEVERE OBESITY WITH BODY MASS INDEX (BMI) OF 40.0 TO 44.9 IN ADULT, UNSPECIFIED OBESITY TYPE, UNSPECIFIED WHETHER SERIOUS COMORBIDITY PRESENT: Primary | ICD-10-CM

## 2024-10-01 DIAGNOSIS — L84 FOOT CALLUS: ICD-10-CM

## 2024-10-01 PROBLEM — I10 PRIMARY HYPERTENSION: Status: RESOLVED | Noted: 2023-03-11 | Resolved: 2024-10-01

## 2024-10-01 PROCEDURE — 3066F NEPHROPATHY DOC TX: CPT | Mod: CPTII,S$GLB,, | Performed by: FAMILY MEDICINE

## 2024-10-01 PROCEDURE — 99396 PREV VISIT EST AGE 40-64: CPT | Mod: S$GLB,,, | Performed by: FAMILY MEDICINE

## 2024-10-01 PROCEDURE — 3079F DIAST BP 80-89 MM HG: CPT | Mod: CPTII,S$GLB,, | Performed by: FAMILY MEDICINE

## 2024-10-01 PROCEDURE — 3075F SYST BP GE 130 - 139MM HG: CPT | Mod: CPTII,S$GLB,, | Performed by: FAMILY MEDICINE

## 2024-10-01 PROCEDURE — 1159F MED LIST DOCD IN RCRD: CPT | Mod: CPTII,S$GLB,, | Performed by: FAMILY MEDICINE

## 2024-10-01 PROCEDURE — 3008F BODY MASS INDEX DOCD: CPT | Mod: CPTII,S$GLB,, | Performed by: FAMILY MEDICINE

## 2024-10-01 PROCEDURE — 3061F NEG MICROALBUMINURIA REV: CPT | Mod: CPTII,S$GLB,, | Performed by: FAMILY MEDICINE

## 2024-10-01 RX ORDER — COLCHICINE 0.6 MG/1
0.6 TABLET ORAL DAILY PRN
Qty: 90 TABLET | Refills: 1 | Status: CANCELLED | OUTPATIENT
Start: 2024-10-01

## 2024-10-01 RX ORDER — PHENTERMINE AND TOPIRAMATE 3.75; 23 MG/1; MG/1
CAPSULE, EXTENDED RELEASE ORAL
Qty: 44 CAPSULE | Refills: 3 | Status: SHIPPED | OUTPATIENT
Start: 2024-10-01

## 2024-10-01 RX ORDER — PHENTERMINE AND TOPIRAMATE 3.75; 23 MG/1; MG/1
CAPSULE, EXTENDED RELEASE ORAL
Qty: 44 CAPSULE | Refills: 3 | Status: CANCELLED | OUTPATIENT
Start: 2024-10-01

## 2024-10-01 RX ORDER — PHENTERMINE AND TOPIRAMATE 3.75; 23 MG/1; MG/1
CAPSULE, EXTENDED RELEASE ORAL
Qty: 44 CAPSULE | Refills: 3 | Status: SHIPPED | OUTPATIENT
Start: 2024-10-01 | End: 2024-10-01 | Stop reason: SDUPTHER

## 2024-10-01 NOTE — PROGRESS NOTES
SUBJECTIVE:    Patient ID: Stewart Barreto is a 54 y.o. male.    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  He works at [employer] in the [employer] division for the [employer].  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  He has had no exercise lately and may have more time to exercise when working from home.    He is worried about his weight in his BMI is up to 40 now indicating morbid obesity.  He would like to try Qsymia for weight loss.  He will mail this off to [facility] mail order.    He is a nonsmoker, drinks 2-3 whiskey drinks per night.    He snores a lot in his sleep, both he and his wife wear ear plugs to sleep.  He is slightly tired in the morning when he wakes up.  Has not had any sleep studies done yet    Seborrhea-followed by Dr. Gonzalez treated with ketoconazole shampoo and antifungal creams    Multifocal motor neuropathy-getting every 2 week infusions of IVIG combined with Solu-Medrol once a month, he is been holding steady but still can not walk 1 mi secondary to back pain.  He can walk in [employer] successfully he is now using a cane to some leg weakness.  He trips and falls if it gets very tired.  His hands and fingers ache somewhat.    Foot calluses-he now using pumice stones and urea cream    Rarely uses Xanax or temazepam anymore.    Hyperlipidemia  Pertinent negatives include no chest pain, myalgias or shortness of breath.       Telephone on 09/19/2024   Component Date Value Ref Range Status    TSH w/reflex to FT4 09/24/2024 1.96  0.40 - 4.50 mIU/L Final    Color, UA 09/24/2024 DARK YELLOW  YELLOW Final    Appearance, UA 09/24/2024 CLEAR  CLEAR Final    Specific Gravity, UA 09/24/2024 1.020  1.001 - 1.035 Final    pH, UA 09/24/2024 6.0  5.0 - 8.0 Final    Glucose, UA 09/24/2024 NEGATIVE  NEGATIVE Final    Bilirubin, UA 09/24/2024 NEGATIVE   NEGATIVE Final    Ketones, UA 09/24/2024 TRACE (A)  NEGATIVE Final    Occult Blood UA 09/24/2024 NEGATIVE  NEGATIVE Final    Protein, UA 09/24/2024 TRACE (A)  NEGATIVE Final    Nitrite, UA 09/24/2024 NEGATIVE  NEGATIVE Final    Leukocytes, UA 09/24/2024 NEGATIVE  NEGATIVE Final    WBC Casts, UA 09/24/2024 NONE SEEN  < OR = 5 /HPF Final    RBC Casts, UA 09/24/2024 NONE SEEN  < OR = 2 /HPF Final    Squam Epithel, UA 09/24/2024 NONE SEEN  < OR = 5 /HPF Final    Bacteria, UA 09/24/2024 NONE SEEN  NONE SEEN /HPF Final    Hyaline Casts, UA 09/24/2024 10-20 (A)  NONE SEEN /LPF Final    Service Cmt: 09/24/2024 SEE COMMENT   Final    Reflexive Urine Culture 09/24/2024 SEE COMMENT   Final    PROSTATE SPECIFIC ANTIGEN, SCR - Q* 09/24/2024 0.22  < OR = 4.00 ng/mL Final    Cholesterol 09/24/2024 201 (H)  <200 mg/dL Final    HDL 09/24/2024 53  > OR = 40 mg/dL Final    Triglycerides 09/24/2024 182 (H)  <150 mg/dL Final    LDL Cholesterol 09/24/2024 118 (H)  mg/dL (calc) Final    HDL/Cholesterol Ratio 09/24/2024 3.8  <5.0 (calc) Final    Non HDL Chol. (LDL+VLDL) 09/24/2024 148 (H)  <130 mg/dL (calc) Final    Glucose 09/24/2024 110 (H)  65 - 99 mg/dL Final    BUN 09/24/2024 16  7 - 25 mg/dL Final    Creatinine 09/24/2024 0.83  0.70 - 1.30 mg/dL Final    eGFR 09/24/2024 105  > OR = 60 mL/min/1.73m2 Final    BUN/Creatinine Ratio 09/24/2024 SEE NOTE:  6 - 22 (calc) Final    Sodium 09/24/2024 139  135 - 146 mmol/L Final    Potassium 09/24/2024 4.1  3.5 - 5.3 mmol/L Final    Chloride 09/24/2024 100  98 - 110 mmol/L Final    CO2 09/24/2024 31  20 - 32 mmol/L Final    Calcium 09/24/2024 9.2  8.6 - 10.3 mg/dL Final    Total Protein 09/24/2024 8.9 (H)  6.1 - 8.1 g/dL Final    Albumin 09/24/2024 3.9  3.6 - 5.1 g/dL Final    Globulin, Total 09/24/2024 5.0 (H)  1.9 - 3.7 g/dL (calc) Final    Albumin/Globulin Ratio 09/24/2024 0.8 (L)  1.0 - 2.5 (calc) Final    Total Bilirubin 09/24/2024 0.7  0.2 - 1.2 mg/dL Final    Alkaline Phosphatase  2024 42  35 - 144 U/L Final    AST 2024 35  10 - 35 U/L Final    ALT 2024 39  9 - 46 U/L Final    Creatinine, Urine 2024 250  20 - 320 mg/dL Final    Microalb, Ur 2024 2.5  See Note: mg/dL Final    Microalb/Creat Ratio 2024 10  <30 mg/g creat Final    WBC 2024 4.5  3.8 - 10.8 Thousand/uL Final    RBC 2024 4.65  4.20 - 5.80 Million/uL Final    Hemoglobin 2024 15.3  13.2 - 17.1 g/dL Final    Hematocrit 2024 47.4  38.5 - 50.0 % Final    MCV 2024 101.9 (H)  80.0 - 100.0 fL Final    MCH 2024 32.9  27.0 - 33.0 pg Final    MCHC 2024 32.3  32.0 - 36.0 g/dL Final    RDW 2024 13.7  11.0 - 15.0 % Final    Platelets 2024 258  140 - 400 Thousand/uL Final    MPV 2024 10.1  7.5 - 12.5 fL Final    Neutrophils, Abs 2024 2,570  1,500 - 7,800 cells/uL Final    Lymph # 2024 1,332  850 - 3,900 cells/uL Final    Mono # 2024 450  200 - 950 cells/uL Final    Eos # 2024 99  15 - 500 cells/uL Final    Baso # 2024 50  0 - 200 cells/uL Final    Neutrophils Relative 2024 57.1  % Final    Lymph % 2024 29.6  % Final    Mono % 2024 10.0  % Final    Eosinophil % 2024 2.2  % Final    Basophil % 2024 1.1  % Final       No past medical history on file.  Social History     Socioeconomic History    Marital status:    Tobacco Use    Smoking status: Former     Current packs/day: 0.00     Types: Cigarettes     Quit date: 2010     Years since quittin.2    Smokeless tobacco: Never   Substance and Sexual Activity    Alcohol use: Yes     Comment: socially     Social Drivers of Health     Financial Resource Strain: Low Risk  (3/22/2024)    Overall Financial Resource Strain (CARDIA)     Difficulty of Paying Living Expenses: Not hard at all   Food Insecurity: No Food Insecurity (3/22/2024)    Hunger Vital Sign     Worried About Running Out of Food in the Last Year: Never true     Ran Out of  Food in the Last Year: Never true   Transportation Needs: No Transportation Needs (3/22/2024)    PRAPARE - Transportation     Lack of Transportation (Medical): No     Lack of Transportation (Non-Medical): No   Physical Activity: Insufficiently Active (3/22/2024)    Exercise Vital Sign     Days of Exercise per Week: 3 days     Minutes of Exercise per Session: 10 min   Stress: Stress Concern Present (3/22/2024)    [employer] - Occupational Stress Questionnaire     Feeling of Stress : To some extent   Housing Stability: Low Risk  (3/22/2024)    Housing Stability Vital Sign     Unable to Pay for Housing in the Last Year: No     Number of Places Lived in the Last Year: 1     Unstable Housing in the Last Year: No     Past Surgical History:   Procedure Laterality Date    TONSILLECTOMY       Family History   Problem Relation Name Age of Onset    Allergic rhinitis Mother      Rhinitis Father      Asthma Father         The 10-year CVD risk score (Bryan et al., 2008) is: 10%    Values used to calculate the score:      Age: 54 years      Sex: Male      Diabetic: No      Tobacco smoker: No      Systolic Blood Pressure: 138 mmHg      Is BP treated: No      HDL Cholesterol: 53 mg/dL      Total Cholesterol: 201 mg/dL    All of your core healthy metrics are met.      Review of patient's allergies indicates:  No Known Allergies    Current Outpatient Medications:     azelastine (ASTELIN) 137 mcg (0.1 %) nasal spray, 1 spray (137 mcg total) by Nasal route 2 (two) times daily., Disp: 30 mL, Rfl: 5    clobetasoL (TEMOVATE) 0.05 % external solution, Apply to scaling areas on scalp 1-2x/day.  For scalp only, Disp: 50 mL, Rfl: 11    colchicine (COLCRYS) 0.6 mg tablet, Take 1 tablet (0.6 mg total) by mouth daily as needed., Disp: 90 tablet, Rfl: 1    docusate sodium (COLACE) 100 MG capsule, Take 100 mg by mouth 2 (two) times daily., Disp: , Rfl:     famotidine (PEPCID) 20 MG tablet, Take 20 mg by mouth.,  Disp: , Rfl:     hydrocortisone 2.5 % lotion, aaa bid x 1-2 wks prn scaling on face, Disp: 60 mL, Rfl: 6    immun glob G,IgG,/pro/IgA 0-50 (PRIVIGEN IV), Inject into the vein every 14 (fourteen) days., Disp: , Rfl:     ketoconazole (NIZORAL) 2 % shampoo, Wash all scaling areas let sit 3 minutes then rinse 3x/wk, Disp: 120 mL, Rfl: 11    melatonin 5 mg TbIE, , Disp: , Rfl:     multivitamin Tab, , Disp: , Rfl:     niacin 250 MG CpSR, , Disp: , Rfl:     omega 3-dha-epa-fish oil 1,000 mg (120 mg-180 mg) Cap, Take by mouth., Disp: , Rfl:     temazepam (RESTORIL) 15 mg Cap, Take 1 capsule (15 mg total) by mouth every evening., Disp: 30 capsule, Rfl: 4    ALPRAZolam (XANAX) 0.5 MG tablet, Take 1 tablet (0.5 mg total) by mouth 2 (two) times daily as needed for Anxiety., Disp: 60 tablet, Rfl: 4    phentermine-topiramate (QSYMIA) 3.75-23 mg CM24, New Patient titration pack.  Two weeks of the 3.75-23, followed by 4 weeks of 7.5-45 mg daily., Disp: 44 capsule, Rfl: 3    Review of Systems   Constitutional:  Negative for activity change, appetite change, chills, fatigue, fever and unexpected weight change.   HENT:  Positive for rhinorrhea. Negative for ear pain, hearing loss and trouble swallowing.    Eyes:  Positive for visual disturbance. Negative for pain and discharge.   Respiratory:  Negative for apnea, cough, chest tightness, shortness of breath and wheezing.    Cardiovascular:  Positive for palpitations. Negative for chest pain and leg swelling.   Gastrointestinal:  Positive for constipation. Negative for abdominal pain, blood in stool, diarrhea, nausea, vomiting and reflux.   Endocrine: Negative for cold intolerance, heat intolerance, polydipsia and polyuria.   Genitourinary:  Negative for bladder incontinence, difficulty urinating, dysuria, erectile dysfunction, frequency, hematuria, testicular pain and urgency.   Musculoskeletal:  Positive for back pain. Negative for arthralgias, gait problem, joint swelling, myalgias  and neck pain.   Neurological:  Negative for dizziness, seizures, weakness, numbness and headaches.   Psychiatric/Behavioral:  Negative for agitation, behavioral problems, confusion, dysphoric mood and hallucinations. The patient is not nervous/anxious.            Objective:      Vitals:    10/01/24 0809   BP: 138/88   Pulse: 100   SpO2: 98%   Weight: 135.6 kg (299 lb)   Height: 6' (1.829 m)     Physical Exam  Vitals and nursing note reviewed.   Constitutional:       General: He is not in acute distress.     Appearance: Normal appearance. He is well-developed. He is not toxic-appearing.      Comments: Morbidly obese male in no distress walking with a cane   HENT:      Head: Normocephalic and atraumatic.      Right Ear: Tympanic membrane and external ear normal.      Left Ear: Tympanic membrane and external ear normal.      Nose: Nose normal.      Mouth/Throat:      Pharynx: Oropharynx is clear. No posterior oropharyngeal erythema.   Eyes:      Pupils: Pupils are equal, round, and reactive to light.   Neck:      Thyroid: No thyromegaly.      Vascular: No carotid bruit.   Cardiovascular:      Rate and Rhythm: Normal rate and regular rhythm.      Heart sounds: Normal heart sounds. No murmur heard.  Pulmonary:      Effort: Pulmonary effort is normal.      Breath sounds: Normal breath sounds. No wheezing or rales.   Abdominal:      General: Bowel sounds are normal. There is no distension.      Palpations: Abdomen is soft.      Tenderness: There is no abdominal tenderness.   Musculoskeletal:         General: No tenderness or deformity. Normal range of motion.      Cervical back: Normal range of motion and neck supple.      Lumbar back: Normal. No spasms.      Comments: Bends 90 degrees at  waist, shoulders and knees have full range of motion, no pitting edema to lower extremities   Feet:      Left foot:      Skin integrity: Callus (left foot has a callus versus plantar wart 5th MTP area) present.   Lymphadenopathy:       Cervical: No cervical adenopathy.   Skin:     General: Skin is warm and dry.      Findings: No rash.   Neurological:      General: No focal deficit present.      Mental Status: He is alert and oriented to person, place, and time. Mental status is at baseline.      Cranial Nerves: No cranial nerve deficit.      Coordination: Coordination normal.      Gait: Gait abnormal (Slow cautious gait with a cane).   Psychiatric:         Mood and Affect: Mood normal.         Behavior: Behavior normal.         Thought Content: Thought content normal.         Judgment: Judgment normal.           Assessment:       1. Class 3 severe obesity with body mass index (BMI) of 40.0 to 44.9 in adult, unspecified obesity type, unspecified whether serious comorbidity present    2. Idiopathic chronic gout of multiple sites without tophus    3. Multifocal motor neuropathy    4. Motor neuron disease    5. Chronic rhinitis    6. Morbid obesity with BMI of 40.0-44.9, adult    7. Idiopathic chronic gout of foot without tophus, unspecified laterality    8. Primary insomnia    9. Foot callus    10. Mixed hyperlipidemia         Plan:       Class 3 severe obesity with body mass index (BMI) of 40.0 to 44.9 in adult, unspecified obesity type, unspecified whether serious comorbidity present  -     phentermine-topiramate (QSYMIA) 3.75-23 mg CM24; New Patient titration pack.  Two weeks of the 3.75-23, followed by 4 weeks of 7.5-45 mg daily.  Dispense: 44 capsule; Refill: 3  Patient weighs 299 lb, BMI is 40.55.  Agree with trial of Qsymia, cautioned patient if develops chest pain or chest tightness to discontinue medication.  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  Loss should help correct this.  RTC 6 months    No follow-ups on file.        10/1/2024 [place] Morris

## 2024-11-19 ENCOUNTER — PATIENT MESSAGE (OUTPATIENT)
Dept: FAMILY MEDICINE | Facility: CLINIC | Age: 54
End: 2024-11-19
Payer: COMMERCIAL

## 2024-11-19 DIAGNOSIS — E66.01 CLASS 3 SEVERE OBESITY WITH BODY MASS INDEX (BMI) OF 40.0 TO 44.9 IN ADULT, UNSPECIFIED OBESITY TYPE, UNSPECIFIED WHETHER SERIOUS COMORBIDITY PRESENT: Primary | ICD-10-CM

## 2024-11-19 DIAGNOSIS — E66.813 CLASS 3 SEVERE OBESITY WITH BODY MASS INDEX (BMI) OF 40.0 TO 44.9 IN ADULT, UNSPECIFIED OBESITY TYPE, UNSPECIFIED WHETHER SERIOUS COMORBIDITY PRESENT: Primary | ICD-10-CM

## 2024-11-20 RX ORDER — PHENTERMINE AND TOPIRAMATE 7.5; 46 MG/1; MG/1
1 CAPSULE, EXTENDED RELEASE ORAL DAILY
Qty: 30 CAPSULE | Refills: 2 | Status: SHIPPED | OUTPATIENT
Start: 2024-11-20

## 2024-12-16 ENCOUNTER — PATIENT MESSAGE (OUTPATIENT)
Dept: FAMILY MEDICINE | Facility: CLINIC | Age: 54
End: 2024-12-16
Payer: COMMERCIAL

## 2024-12-16 DIAGNOSIS — E66.01 CLASS 3 SEVERE OBESITY WITH BODY MASS INDEX (BMI) OF 40.0 TO 44.9 IN ADULT, UNSPECIFIED OBESITY TYPE, UNSPECIFIED WHETHER SERIOUS COMORBIDITY PRESENT: ICD-10-CM

## 2024-12-16 DIAGNOSIS — E66.813 CLASS 3 SEVERE OBESITY WITH BODY MASS INDEX (BMI) OF 40.0 TO 44.9 IN ADULT, UNSPECIFIED OBESITY TYPE, UNSPECIFIED WHETHER SERIOUS COMORBIDITY PRESENT: ICD-10-CM

## 2024-12-16 RX ORDER — PHENTERMINE AND TOPIRAMATE 7.5; 46 MG/1; MG/1
1 CAPSULE, EXTENDED RELEASE ORAL DAILY
Qty: 30 CAPSULE | Refills: 2 | Status: SHIPPED | OUTPATIENT
Start: 2024-12-16

## 2025-01-09 ENCOUNTER — PATIENT MESSAGE (OUTPATIENT)
Dept: FAMILY MEDICINE | Facility: CLINIC | Age: 55
End: 2025-01-09
Payer: COMMERCIAL

## 2025-01-09 DIAGNOSIS — E66.01 CLASS 3 SEVERE OBESITY WITH BODY MASS INDEX (BMI) OF 40.0 TO 44.9 IN ADULT, UNSPECIFIED OBESITY TYPE, UNSPECIFIED WHETHER SERIOUS COMORBIDITY PRESENT: ICD-10-CM

## 2025-01-09 DIAGNOSIS — E66.813 CLASS 3 SEVERE OBESITY WITH BODY MASS INDEX (BMI) OF 40.0 TO 44.9 IN ADULT, UNSPECIFIED OBESITY TYPE, UNSPECIFIED WHETHER SERIOUS COMORBIDITY PRESENT: ICD-10-CM

## 2025-01-09 RX ORDER — PHENTERMINE AND TOPIRAMATE 7.5; 46 MG/1; MG/1
1 CAPSULE, EXTENDED RELEASE ORAL DAILY
Qty: 90 CAPSULE | Refills: 0 | Status: SHIPPED | OUTPATIENT
Start: 2025-01-09

## 2025-02-02 ENCOUNTER — PATIENT MESSAGE (OUTPATIENT)
Dept: FAMILY MEDICINE | Facility: CLINIC | Age: 55
End: 2025-02-02
Payer: COMMERCIAL

## 2025-02-02 DIAGNOSIS — E66.01 CLASS 3 SEVERE OBESITY WITH BODY MASS INDEX (BMI) OF 40.0 TO 44.9 IN ADULT, UNSPECIFIED OBESITY TYPE, UNSPECIFIED WHETHER SERIOUS COMORBIDITY PRESENT: ICD-10-CM

## 2025-02-02 DIAGNOSIS — E66.813 CLASS 3 SEVERE OBESITY WITH BODY MASS INDEX (BMI) OF 40.0 TO 44.9 IN ADULT, UNSPECIFIED OBESITY TYPE, UNSPECIFIED WHETHER SERIOUS COMORBIDITY PRESENT: ICD-10-CM

## 2025-02-03 RX ORDER — PHENTERMINE AND TOPIRAMATE 7.5; 46 MG/1; MG/1
1 CAPSULE, EXTENDED RELEASE ORAL DAILY
Qty: 30 CAPSULE | Refills: 0 | Status: SHIPPED | OUTPATIENT
Start: 2025-02-03 | End: 2025-02-24 | Stop reason: SDUPTHER

## 2025-02-05 DIAGNOSIS — F41.9 ANXIETY: ICD-10-CM

## 2025-02-05 RX ORDER — ALPRAZOLAM 0.5 MG/1
0.5 TABLET ORAL 2 TIMES DAILY PRN
Qty: 60 TABLET | Refills: 4 | Status: SHIPPED | OUTPATIENT
Start: 2025-02-05 | End: 2025-03-07

## 2025-02-24 DIAGNOSIS — F51.01 PRIMARY INSOMNIA: ICD-10-CM

## 2025-02-24 DIAGNOSIS — E66.01 CLASS 3 SEVERE OBESITY WITH BODY MASS INDEX (BMI) OF 40.0 TO 44.9 IN ADULT, UNSPECIFIED OBESITY TYPE, UNSPECIFIED WHETHER SERIOUS COMORBIDITY PRESENT: ICD-10-CM

## 2025-02-24 DIAGNOSIS — E66.813 CLASS 3 SEVERE OBESITY WITH BODY MASS INDEX (BMI) OF 40.0 TO 44.9 IN ADULT, UNSPECIFIED OBESITY TYPE, UNSPECIFIED WHETHER SERIOUS COMORBIDITY PRESENT: ICD-10-CM

## 2025-02-24 RX ORDER — TEMAZEPAM 15 MG/1
15 CAPSULE ORAL NIGHTLY
Qty: 30 CAPSULE | Refills: 4 | Status: SHIPPED | OUTPATIENT
Start: 2025-02-24

## 2025-02-24 RX ORDER — PHENTERMINE AND TOPIRAMATE 7.5; 46 MG/1; MG/1
1 CAPSULE, EXTENDED RELEASE ORAL DAILY
Qty: 30 CAPSULE | Refills: 0 | Status: SHIPPED | OUTPATIENT
Start: 2025-02-24

## 2025-03-03 ENCOUNTER — PATIENT MESSAGE (OUTPATIENT)
Dept: FAMILY MEDICINE | Facility: CLINIC | Age: 55
End: 2025-03-03
Payer: COMMERCIAL

## 2025-03-06 ENCOUNTER — TELEPHONE (OUTPATIENT)
Dept: FAMILY MEDICINE | Facility: CLINIC | Age: 55
End: 2025-03-06
Payer: COMMERCIAL

## 2025-03-06 NOTE — TELEPHONE ENCOUNTER
----- Message from Eliz sent at 3/6/2025  2:16 PM CST -----  Pt called to get the original letters that was faxed over regarding his medication. They would like to come and pick them up today.[phone]

## 2025-03-30 DIAGNOSIS — E66.813 CLASS 3 SEVERE OBESITY WITH BODY MASS INDEX (BMI) OF 40.0 TO 44.9 IN ADULT, UNSPECIFIED OBESITY TYPE, UNSPECIFIED WHETHER SERIOUS COMORBIDITY PRESENT: ICD-10-CM

## 2025-03-30 DIAGNOSIS — E66.01 CLASS 3 SEVERE OBESITY WITH BODY MASS INDEX (BMI) OF 40.0 TO 44.9 IN ADULT, UNSPECIFIED OBESITY TYPE, UNSPECIFIED WHETHER SERIOUS COMORBIDITY PRESENT: ICD-10-CM

## 2025-03-30 RX ORDER — PHENTERMINE AND TOPIRAMATE 7.5; 46 MG/1; MG/1
1 CAPSULE, EXTENDED RELEASE ORAL DAILY
Qty: 30 CAPSULE | Refills: 0 | Status: CANCELLED | OUTPATIENT
Start: 2025-03-30

## 2025-03-31 ENCOUNTER — PATIENT MESSAGE (OUTPATIENT)
Dept: FAMILY MEDICINE | Facility: CLINIC | Age: 55
End: 2025-03-31
Payer: COMMERCIAL

## 2025-03-31 DIAGNOSIS — E66.813 CLASS 3 SEVERE OBESITY WITH BODY MASS INDEX (BMI) OF 40.0 TO 44.9 IN ADULT, UNSPECIFIED OBESITY TYPE, UNSPECIFIED WHETHER SERIOUS COMORBIDITY PRESENT: ICD-10-CM

## 2025-03-31 DIAGNOSIS — E66.01 CLASS 3 SEVERE OBESITY WITH BODY MASS INDEX (BMI) OF 40.0 TO 44.9 IN ADULT, UNSPECIFIED OBESITY TYPE, UNSPECIFIED WHETHER SERIOUS COMORBIDITY PRESENT: ICD-10-CM

## 2025-03-31 RX ORDER — PHENTERMINE AND TOPIRAMATE 7.5; 46 MG/1; MG/1
1 CAPSULE, EXTENDED RELEASE ORAL DAILY
Qty: 30 CAPSULE | Refills: 0 | Status: SHIPPED | OUTPATIENT
Start: 2025-03-31

## 2025-04-03 ENCOUNTER — TELEPHONE (OUTPATIENT)
Dept: FAMILY MEDICINE | Facility: CLINIC | Age: 55
End: 2025-04-03
Payer: COMMERCIAL

## 2025-04-23 ENCOUNTER — OFFICE VISIT (OUTPATIENT)
Dept: FAMILY MEDICINE | Facility: CLINIC | Age: 55
End: 2025-04-23
Payer: COMMERCIAL

## 2025-04-23 VITALS
OXYGEN SATURATION: 95 % | SYSTOLIC BLOOD PRESSURE: 116 MMHG | HEIGHT: 72 IN | HEART RATE: 102 BPM | WEIGHT: 273 LBS | DIASTOLIC BLOOD PRESSURE: 80 MMHG | BODY MASS INDEX: 36.98 KG/M2

## 2025-04-23 DIAGNOSIS — G12.20 MOTOR NEURON DISEASE: ICD-10-CM

## 2025-04-23 DIAGNOSIS — M1A.0790 IDIOPATHIC CHRONIC GOUT OF FOOT WITHOUT TOPHUS, UNSPECIFIED LATERALITY: ICD-10-CM

## 2025-04-23 DIAGNOSIS — E78.2 MIXED HYPERLIPIDEMIA: ICD-10-CM

## 2025-04-23 DIAGNOSIS — E66.01 MORBID OBESITY WITH BMI OF 40.0-44.9, ADULT: ICD-10-CM

## 2025-04-23 DIAGNOSIS — N48.6 PEYRONIE'S DISEASE: ICD-10-CM

## 2025-04-23 DIAGNOSIS — G61.82 MULTIFOCAL MOTOR NEUROPATHY: Primary | ICD-10-CM

## 2025-04-23 DIAGNOSIS — F51.01 PRIMARY INSOMNIA: ICD-10-CM

## 2025-04-23 DIAGNOSIS — N52.9 ERECTILE DYSFUNCTION, UNSPECIFIED ERECTILE DYSFUNCTION TYPE: ICD-10-CM

## 2025-04-23 RX ORDER — SILDENAFIL 100 MG/1
100 TABLET, FILM COATED ORAL DAILY PRN
Qty: 20 TABLET | Refills: 2 | Status: SHIPPED | OUTPATIENT
Start: 2025-04-23 | End: 2026-04-23

## 2025-04-23 NOTE — PROGRESS NOTES
SUBJECTIVE:    Patient ID: Stewart Barreto is a 54 y.o. male.    Chief Complaint: Follow-up (No bottles//Pt is here for a check up//FRANCISCA )    Follow-up  Pertinent negatives include no arthralgias, chest pain, headaches, joint swelling, neck pain, vomiting or weakness.       History of Present Illness    CHIEF COMPLAINT:  Stewart presents today for follow up after recent trip to [place].    RECENT TRAVEL AND PHYSICAL ACTIVITY:  He recently traveled to [place] for his 32th anniversary. His mobility improved throughout the trip - initially using a scooter for museum visits, but by the second week was able to walk up to 12 miles. He required occasional back massage with a wooden massager during the trip. He denies any pollen-related allergy symptoms during travel.    WEIGHT MANAGEMENT:  He reports significant weight loss of 27 lbs, now able to fit into clothes he has not worn in over 10 years. He continues to lose weight without reaching a plateau. He takes QCM daily as an appetite suppressant, noting significant reduction in hunger and snacking frequency.    MEDICAL HISTORY:  He has a history of multifocal motor neuropathy, currently stable. Last colonoscopy was in October 2020 with polyp removal, requiring 3-year follow-up.    MEDICATIONS:  He receives IVIG treatment every two weeks, noting that due to weight loss, the effective dose has increased as the amount has not changed relative to his body mass. He reports feeling less urgency for IVIG treatments, even after extended activity. He uses sleep medication occasionally (1-2 times every couple weeks) and reports reduced use of Alprazolam.    SOCIAL HISTORY:  He reports reduced alcohol consumption, no longer drinking daily at home, now only having occasional whiskey.      ROS:  Constitutional: -appetite change, -chills, -fatigue, -fever, -unexpected weight change  HENT: -ear pain, -trouble swallowing  Eyes: -pain, -discharge, -visual disturbance  Respiratory:  -apnea, -cough, -shortness of breath, -wheezing  Cardiovascular: -chest pain, -leg swelling  Gastrointestinal: -abdominal pain, -blood in stool, -constipation, -diarrhea, -nausea, -vomiting, -reflux, +loss of appetite  Endocrine: -cold intolerance, -heat intolerance, -polydipsia  Genitourinary: -bladder incontinence, -dysuria, -erectile dysfunction, -frequency, -hematuria, -testicular pain, -urgency, -nocturia  Musculoskeletal: -gait problem, -joint swelling, -myalgia, +back pain  Neurological: -dizziness, -seizures, -numbness  Psychiatric/Behavioral: -agitation, -hallucinations, -nervous, -anxiety symptoms         No visits with results within 6 Month(s) from this visit.   Latest known visit with results is:   Telephone on 09/19/2024   Component Date Value Ref Range Status    TSH w/reflex to FT4 09/24/2024 1.96  0.40 - 4.50 mIU/L Final    Color, UA 09/24/2024 DARK YELLOW  YELLOW Final    Appearance, UA 09/24/2024 CLEAR  CLEAR Final    Specific Gravity, UA 09/24/2024 1.020  1.001 - 1.035 Final    pH, UA 09/24/2024 6.0  5.0 - 8.0 Final    Glucose, UA 09/24/2024 NEGATIVE  NEGATIVE Final    Bilirubin, UA 09/24/2024 NEGATIVE  NEGATIVE Final    Ketones, UA 09/24/2024 TRACE (A)  NEGATIVE Final    Occult Blood UA 09/24/2024 NEGATIVE  NEGATIVE Final    Protein, UA 09/24/2024 TRACE (A)  NEGATIVE Final    Nitrite, UA 09/24/2024 NEGATIVE  NEGATIVE Final    Leukocytes, UA 09/24/2024 NEGATIVE  NEGATIVE Final    WBC Casts, UA 09/24/2024 NONE SEEN  < OR = 5 /HPF Final    RBC Casts, UA 09/24/2024 NONE SEEN  < OR = 2 /HPF Final    Squam Epithel, UA 09/24/2024 NONE SEEN  < OR = 5 /HPF Final    Bacteria, UA 09/24/2024 NONE SEEN  NONE SEEN /HPF Final    Hyaline Casts, UA 09/24/2024 10-20 (A)  NONE SEEN /LPF Final    Service Cmt: 09/24/2024 SEE COMMENT   Final    Reflexive Urine Culture 09/24/2024 SEE COMMENT   Final    PROSTATE SPECIFIC ANTIGEN, SCR - Q* 09/24/2024 0.22  < OR = 4.00 ng/mL Final    Cholesterol 09/24/2024 201 (H)  <200  mg/dL Final    HDL 09/24/2024 53  > OR = 40 mg/dL Final    Triglycerides 09/24/2024 182 (H)  <150 mg/dL Final    LDL Cholesterol 09/24/2024 118 (H)  mg/dL (calc) Final    HDL/Cholesterol Ratio 09/24/2024 3.8  <5.0 (calc) Final    Non HDL Chol. (LDL+VLDL) 09/24/2024 148 (H)  <130 mg/dL (calc) Final    Glucose 09/24/2024 110 (H)  65 - 99 mg/dL Final    BUN 09/24/2024 16  7 - 25 mg/dL Final    Creatinine 09/24/2024 0.83  0.70 - 1.30 mg/dL Final    eGFR 09/24/2024 105  > OR = 60 mL/min/1.73m2 Final    BUN/Creatinine Ratio 09/24/2024 SEE NOTE:  6 - 22 (calc) Final    Sodium 09/24/2024 139  135 - 146 mmol/L Final    Potassium 09/24/2024 4.1  3.5 - 5.3 mmol/L Final    Chloride 09/24/2024 100  98 - 110 mmol/L Final    CO2 09/24/2024 31  20 - 32 mmol/L Final    Calcium 09/24/2024 9.2  8.6 - 10.3 mg/dL Final    Total Protein 09/24/2024 8.9 (H)  6.1 - 8.1 g/dL Final    Albumin 09/24/2024 3.9  3.6 - 5.1 g/dL Final    Globulin, Total 09/24/2024 5.0 (H)  1.9 - 3.7 g/dL (calc) Final    Albumin/Globulin Ratio 09/24/2024 0.8 (L)  1.0 - 2.5 (calc) Final    Total Bilirubin 09/24/2024 0.7  0.2 - 1.2 mg/dL Final    Alkaline Phosphatase 09/24/2024 42  35 - 144 U/L Final    AST 09/24/2024 35  10 - 35 U/L Final    ALT 09/24/2024 39  9 - 46 U/L Final    Creatinine, Urine 09/24/2024 250  20 - 320 mg/dL Final    Microalb, Ur 09/24/2024 2.5  See Note: mg/dL Final    Microalb/Creat Ratio 09/24/2024 10  <30 mg/g creat Final    WBC 09/24/2024 4.5  3.8 - 10.8 Thousand/uL Final    RBC 09/24/2024 4.65  4.20 - 5.80 Million/uL Final    Hemoglobin 09/24/2024 15.3  13.2 - 17.1 g/dL Final    Hematocrit 09/24/2024 47.4  38.5 - 50.0 % Final    MCV 09/24/2024 101.9 (H)  80.0 - 100.0 fL Final    MCH 09/24/2024 32.9  27.0 - 33.0 pg Final    MCHC 09/24/2024 32.3  32.0 - 36.0 g/dL Final    RDW 09/24/2024 13.7  11.0 - 15.0 % Final    Platelets 09/24/2024 258  140 - 400 Thousand/uL Final    MPV 09/24/2024 10.1  7.5 - 12.5 fL Final    Neutrophils, Abs 09/24/2024  2,570  1,500 - 7,800 cells/uL Final    Lymph # 09/24/2024 1,332  850 - 3,900 cells/uL Final    Mono # 09/24/2024 450  200 - 950 cells/uL Final    Eos # 09/24/2024 99  15 - 500 cells/uL Final    Baso # 09/24/2024 50  0 - 200 cells/uL Final    Neutrophils Relative 09/24/2024 57.1  % Final    Lymph % 09/24/2024 29.6  % Final    Mono % 09/24/2024 10.0  % Final    Eosinophil % 09/24/2024 2.2  % Final    Basophil % 09/24/2024 1.1  % Final       History reviewed. No pertinent past medical history.  Social History[1]  Past Surgical History:   Procedure Laterality Date    TONSILLECTOMY  1978     Family History   Problem Relation Name Age of Onset    Allergic rhinitis Mother Emma     Depression Mother Emma     Mental illness Mother Emma     Rhinitis Father Lemuel Barreto     Asthma Father Lemuel Barreto     COPD Father Lemuel Barreto     Cancer Maternal Grandfather Carlos     Diabetes Maternal Grandfather Carlos     Early death Paternal Grandfather Lemuel     Cancer Paternal Grandmother Karolina     Hearing loss Paternal Grandmother Karolina     Vision loss Paternal Grandmother Karolina     Mental illness Daughter Ade Barreto     Mental illness Daughter Ade Barreto        The 10-year CVD risk score (BETO'Carloz, et al., 2008) is: 7.3%    Values used to calculate the score:      Age: 54 years      Sex: Male      Diabetic: No      Tobacco smoker: No      Systolic Blood Pressure: 116 mmHg      Is BP treated: No      HDL Cholesterol: 53 mg/dL      Total Cholesterol: 201 mg/dL    All of your core healthy metrics are met.      Review of patient's allergies indicates:  No Known Allergies  Current Medications[2]    Review of Systems   Constitutional:  Positive for activity change. Negative for unexpected weight change.   HENT:  Positive for rhinorrhea. Negative for hearing loss and trouble swallowing.    Eyes:  Negative for discharge and visual disturbance.   Respiratory:  Negative for chest tightness and wheezing.    Cardiovascular:  Negative for  chest pain and palpitations.   Gastrointestinal:  Positive for constipation. Negative for blood in stool, diarrhea and vomiting.   Endocrine: Negative for polydipsia and polyuria.   Genitourinary:  Positive for erectile dysfunction. Negative for difficulty urinating, hematuria and urgency.   Musculoskeletal:  Negative for arthralgias, joint swelling and neck pain.   Neurological:  Negative for weakness and headaches.   Psychiatric/Behavioral:  Negative for confusion and dysphoric mood.            Objective:      Vitals:    04/23/25 0812   BP: 116/80   Pulse: 102   SpO2: 95%   Weight: 123.8 kg (273 lb)   Height: 6' (1.829 m)     Physical Exam  Vitals and nursing note reviewed.   Constitutional:       General: He is not in acute distress.     Appearance: Normal appearance. He is well-developed. He is obese. He is not toxic-appearing.   HENT:      Head: Normocephalic and atraumatic.      Right Ear: Tympanic membrane and external ear normal.      Left Ear: Tympanic membrane and external ear normal.      Nose: Nose normal.      Mouth/Throat:      Pharynx: Oropharynx is clear. No posterior oropharyngeal erythema.   Eyes:      Pupils: Pupils are equal, round, and reactive to light.   Neck:      Thyroid: No thyromegaly.      Vascular: No carotid bruit.   Cardiovascular:      Rate and Rhythm: Normal rate and regular rhythm.      Heart sounds: Normal heart sounds. No murmur heard.  Pulmonary:      Effort: Pulmonary effort is normal.      Breath sounds: Normal breath sounds. No wheezing or rales.   Abdominal:      General: Bowel sounds are normal. There is no distension.      Palpations: Abdomen is soft.      Tenderness: There is no abdominal tenderness.   Musculoskeletal:         General: No tenderness or deformity. Normal range of motion.      Cervical back: Normal range of motion and neck supple.      Lumbar back: Normal. No spasms.      Comments: Bends 90 degrees at  waist, shoulders and knees have full range of motion, no  pitting edema to lower extremities   Lymphadenopathy:      Cervical: No cervical adenopathy.   Skin:     General: Skin is warm and dry.      Findings: No rash.      Comments: Left great toenail has a subungual hematoma that is old.   Neurological:      General: No focal deficit present.      Mental Status: He is alert and oriented to person, place, and time. Mental status is at baseline.      Cranial Nerves: No cranial nerve deficit.      Coordination: Coordination normal.   Psychiatric:         Mood and Affect: Mood normal.         Behavior: Behavior normal.         Thought Content: Thought content normal.         Judgment: Judgment normal.       Physical Exam                  Assessment:       1. Multifocal motor neuropathy    2. Mixed hyperlipidemia    3. Peyronie's disease    4. Morbid obesity with BMI of 40.0-44.9, adult    5. Idiopathic chronic gout of foot without tophus, unspecified laterality    6. Primary insomnia    7. Motor neuron disease    8. Erectile dysfunction, unspecified erectile dysfunction type         Plan:       Multifocal motor neuropathy    Mixed hyperlipidemia    Peyronie's disease    Morbid obesity with BMI of 40.0-44.9, adult    Idiopathic chronic gout of foot without tophus, unspecified laterality    Primary insomnia    Motor neuron disease    Erectile dysfunction, unspecified erectile dysfunction type  -     sildenafiL (VIAGRA) 100 MG tablet; Take 1 tablet (100 mg total) by mouth daily as needed for Erectile Dysfunction.  Dispense: 20 tablet; Refill: 2      Assessment & Plan    G61.82 Multifocal motor neuropathy  R63.4 Abnormal weight loss  F51.01 Primary insomnia  Z86.0109 Personal history of other colon polyps  Z99.89 Dependence on other enabling machines and devices  Z72.89 Other problems related to lifestyle    IMPRESSION:  - Significant weight loss (27 lbs) and improved mobility, likely due to Qsymia use and lifestyle changes.  - IVIG treatment for multifocal motor neuropathy  appears more effective due to weight loss, as dosage is based on body mass.  - Neurological symptoms stable with no reported changes in fine motor control or mobility.  - Anxiety symptoms improved, with decreased need for Alprazolam.  - Gout well-controlled with no recent attacks.    MULTIFOCAL MOTOR NEUROPATHY:  - Continue IVIG treatments every 2 weeks.  - Stewart reports improved mobility and reduced need for mobility aids during a recent trip to [place], walking 12 lines in total over a week.  - Noted no significant changes in neurological symptoms, with fine motor control remaining stable.  - Assessed the patient's condition as stable with improved mobility, with no reported pain or discomfort during increased physical activity.  - Acknowledged the improvement in the patient's condition and mobility.  - Maintained the current IVIG treatment regimen, with the effective dose increased due to weight loss.    WEIGHT MANAGEMENT:  - Continue Qsymia (phentermine and topiramate) 7.5 mg daily for weight management.  - Noted significant weight loss of 27 lbs, with current weight at 273 lbs, down from 304 lbs a few years ago.  - Confirmed the patient's self-reported weight of 270 lbs using a home scale.  - Acknowledged the positive impact of weight loss on the patient's health and mobility.  - Discussed ongoing weight loss goals, with the patient aiming to lose another 20 lbs, targeting 250 lbs.  - Encouraged continuation of weight loss efforts through exercise, including walking on the treadmill, biking, and swimming.    SLEEP MANAGEMENT:  - Continue sleeping pill as needed, used rarely (once or twice every 2 weeks).  - Noted increased use of sleeping pills during the patient's trip to ensure good sleep, especially due to uncomfortable bed in [place].  - Observed reduced frequency of sleeping pill use at home, indicating improved sleep patterns.    COLON POLYP HISTORY:  - Follow up with Dr. Barrientos for colonoscopy in October  (approximately 3 years from previous procedure on October 3rd) with history of polyp removal.  - Reviewed patient's colonoscopy history, noting polyps were removed 3 years ago.  - Recommend follow-up colonoscopy in October, 3 years after the previous one.  - Suggested discussing the colonoscopy at the next appointment in 6 months.    MOBILITY AID USE:  - Noted the patient's use of a rented scooter for mobility assistance during parts of the trip to [place], and a wheelchair in [place].  - Observed improved mobility, reducing dependence on mobility aids.  - Acknowledged the patient's reduced need for mobility aids, using the scooter only 2 days in [place] and not using the one rented in [place].    LIFESTYLE MANAGEMENT:  - Noted significant reduction in alcohol consumption.  - Observed that the patient now drinks alcohol occasionally, no longer having a daily nightcap.  - A  cknowledged the positive impact of reduced alcohol consumption on weight loss and overall health.  - Encouraged continued moderation in alcohol consumption.  - Stewart to continue current exercise regimen, including swimming and biking.  - Stewart to avoid excessive walking to protect knees.    FOLLOW-UP:  - Follow up in 6 months.         No follow-ups on file.        This note was generated with the assistance of ambient listening technology. Verbal consent was obtained by the patient and accompanying visitor(s) for the recording of patient appointment to facilitate this note. I attest to having reviewed and edited the generated note for accuracy, though some syntax or spelling errors may persist. Please contact the author of this note for any clarification.      4/23/2025 Chico Morris           [1]   Social History  Socioeconomic History    Marital status:    Tobacco Use    Smoking status: Former     Current packs/day: 0.00     Average packs/day: 0.5 packs/day for 16.9 years (8.5 ttl pk-yrs)     Types: Cigarettes     Start date:  10/15/1993     Quit date: 2010     Years since quittin.7    Smokeless tobacco: Never    Tobacco comments:     Grew up in smoking household where both parents smoked   Substance and Sexual Activity    Alcohol use: Yes     Alcohol/week: 2.0 standard drinks of alcohol     Types: 2 Drinks containing 0.5 oz of alcohol per week     Comment: Currently starting to reduce amounts    Drug use: Never    Sexual activity: Yes     Partners: Female     Birth control/protection: Other-see comments     Comment: Partner Hysterectomy     Social Drivers of Health     Financial Resource Strain: Low Risk  (3/22/2024)    Overall Financial Resource Strain (CARDIA)     Difficulty of Paying Living Expenses: Not hard at all   Food Insecurity: No Food Insecurity (3/22/2024)    Hunger Vital Sign     Worried About Running Out of Food in the Last Year: Never true     Ran Out of Food in the Last Year: Never true   Transportation Needs: No Transportation Needs (3/22/2024)    PRAPARE - Transportation     Lack of Transportation (Medical): No     Lack of Transportation (Non-Medical): No   Physical Activity: Insufficiently Active (3/22/2024)    Exercise Vital Sign     Days of Exercise per Week: 3 days     Minutes of Exercise per Session: 10 min   Stress: Stress Concern Present (3/22/2024)    [employer] [place] [employer] - Occupational Stress Questionnaire     Feeling of Stress : To some extent   Housing Stability: Low Risk  (3/22/2024)    Housing Stability Vital Sign     Unable to Pay for Housing in the Last Year: No     Number of Places Lived in the Last Year: 1     Unstable Housing in the Last Year: No   [2]   Current Outpatient Medications:     ALPRAZolam (XANAX) 0.5 MG tablet, Take 1 tablet (0.5 mg total) by mouth 2 (two) times daily as needed for Anxiety., Disp: 60 tablet, Rfl: 4    azelastine (ASTELIN) 137 mcg (0.1 %) nasal spray, 1 spray (137 mcg total) by Nasal route 2 (two) times daily., Disp: 30 mL, Rfl: 5    clobetasoL  (TEMOVATE) 0.05 % external solution, Apply to scaling areas on scalp 1-2x/day.  For scalp only, Disp: 50 mL, Rfl: 11    colchicine (COLCRYS) 0.6 mg tablet, Take 1 tablet (0.6 mg total) by mouth daily as needed., Disp: 90 tablet, Rfl: 1    docusate sodium (COLACE) 100 MG capsule, Take 100 mg by mouth 2 (two) times daily., Disp: , Rfl:     famotidine (PEPCID) 20 MG tablet, Take 20 mg by mouth., Disp: , Rfl:     hydrocortisone 2.5 % lotion, aaa bid x 1-2 wks prn scaling on face, Disp: 60 mL, Rfl: 6    immun glob G,IgG,/pro/IgA 0-50 (PRIVIGEN IV), Inject into the vein every 14 (fourteen) days., Disp: , Rfl:     ketoconazole (NIZORAL) 2 % shampoo, Wash all scaling areas let sit 3 minutes then rinse 3x/wk, Disp: 120 mL, Rfl: 11    melatonin 5 mg TbIE, , Disp: , Rfl:     multivitamin Tab, , Disp: , Rfl:     niacin 250 MG CpSR, , Disp: , Rfl:     omega 3-dha-epa-fish oil 1,000 mg (120 mg-180 mg) Cap, Take by mouth., Disp: , Rfl:     phentermine-topiramate (QSYMIA) 3.75-23 mg CM24, New Patient titration pack.  Two weeks of the 3.75-23, followed by 4 weeks of 7.5-45 mg daily., Disp: 14 capsule, Rfl: 0    phentermine-topiramate (QSYMIA) 7.5-46 mg CM24, Take 1 tablet by mouth once daily., Disp: 30 capsule, Rfl: 0    sildenafiL (VIAGRA) 100 MG tablet, Take 1 tablet (100 mg total) by mouth daily as needed for Erectile Dysfunction., Disp: 20 tablet, Rfl: 2    temazepam (RESTORIL) 15 mg Cap, Take 1 capsule (15 mg total) by mouth every evening., Disp: 30 capsule, Rfl: 4

## 2025-04-24 ENCOUNTER — RESULTS FOLLOW-UP (OUTPATIENT)
Dept: FAMILY MEDICINE | Facility: CLINIC | Age: 55
End: 2025-04-24

## 2025-04-25 NOTE — PROGRESS NOTES
Call patient.  Cholesterol has improved from 201 down to 184.  Triglycerides improved down to 179.  Sugar kidneys and liver all normal.  Continue current medications.  Recheck CMP lipids CBC PSA TSH in six-months

## 2025-05-06 DIAGNOSIS — E66.813 CLASS 3 SEVERE OBESITY WITH BODY MASS INDEX (BMI) OF 40.0 TO 44.9 IN ADULT, UNSPECIFIED OBESITY TYPE, UNSPECIFIED WHETHER SERIOUS COMORBIDITY PRESENT: ICD-10-CM

## 2025-05-06 DIAGNOSIS — E66.01 CLASS 3 SEVERE OBESITY WITH BODY MASS INDEX (BMI) OF 40.0 TO 44.9 IN ADULT, UNSPECIFIED OBESITY TYPE, UNSPECIFIED WHETHER SERIOUS COMORBIDITY PRESENT: ICD-10-CM

## 2025-05-07 RX ORDER — PHENTERMINE AND TOPIRAMATE 7.5; 46 MG/1; MG/1
1 CAPSULE, EXTENDED RELEASE ORAL DAILY
Qty: 30 CAPSULE | Refills: 0 | Status: SHIPPED | OUTPATIENT
Start: 2025-05-07

## 2025-05-25 DIAGNOSIS — E66.813 CLASS 3 SEVERE OBESITY WITH BODY MASS INDEX (BMI) OF 40.0 TO 44.9 IN ADULT, UNSPECIFIED OBESITY TYPE, UNSPECIFIED WHETHER SERIOUS COMORBIDITY PRESENT: ICD-10-CM

## 2025-05-25 DIAGNOSIS — E66.01 CLASS 3 SEVERE OBESITY WITH BODY MASS INDEX (BMI) OF 40.0 TO 44.9 IN ADULT, UNSPECIFIED OBESITY TYPE, UNSPECIFIED WHETHER SERIOUS COMORBIDITY PRESENT: ICD-10-CM

## 2025-05-26 RX ORDER — PHENTERMINE AND TOPIRAMATE 7.5; 46 MG/1; MG/1
1 CAPSULE, EXTENDED RELEASE ORAL DAILY
Qty: 30 CAPSULE | Refills: 0 | Status: SHIPPED | OUTPATIENT
Start: 2025-05-26

## 2025-06-21 DIAGNOSIS — E66.813 CLASS 3 SEVERE OBESITY WITH BODY MASS INDEX (BMI) OF 40.0 TO 44.9 IN ADULT, UNSPECIFIED OBESITY TYPE, UNSPECIFIED WHETHER SERIOUS COMORBIDITY PRESENT: ICD-10-CM

## 2025-06-21 DIAGNOSIS — E66.01 CLASS 3 SEVERE OBESITY WITH BODY MASS INDEX (BMI) OF 40.0 TO 44.9 IN ADULT, UNSPECIFIED OBESITY TYPE, UNSPECIFIED WHETHER SERIOUS COMORBIDITY PRESENT: ICD-10-CM

## 2025-06-23 RX ORDER — PHENTERMINE AND TOPIRAMATE 7.5; 46 MG/1; MG/1
1 CAPSULE, EXTENDED RELEASE ORAL DAILY
Qty: 30 CAPSULE | Refills: 0 | Status: SHIPPED | OUTPATIENT
Start: 2025-06-23

## 2025-07-27 ENCOUNTER — PATIENT MESSAGE (OUTPATIENT)
Dept: FAMILY MEDICINE | Facility: CLINIC | Age: 55
End: 2025-07-27
Payer: COMMERCIAL

## 2025-07-27 DIAGNOSIS — F51.01 PRIMARY INSOMNIA: Primary | ICD-10-CM

## 2025-07-28 RX ORDER — ZOLPIDEM TARTRATE 5 MG/1
5 TABLET ORAL NIGHTLY PRN
Qty: 30 TABLET | Refills: 0 | Status: SHIPPED | OUTPATIENT
Start: 2025-07-28

## 2025-07-29 DIAGNOSIS — E66.813 CLASS 3 SEVERE OBESITY WITH BODY MASS INDEX (BMI) OF 40.0 TO 44.9 IN ADULT, UNSPECIFIED OBESITY TYPE, UNSPECIFIED WHETHER SERIOUS COMORBIDITY PRESENT: ICD-10-CM

## 2025-07-30 RX ORDER — PHENTERMINE AND TOPIRAMATE EXTENDED-RELEASE 7.5; 46 MG/1; MG/1
1 CAPSULE ORAL DAILY
Qty: 30 CAPSULE | Refills: 0 | Status: SHIPPED | OUTPATIENT
Start: 2025-07-30

## 2025-08-30 DIAGNOSIS — F51.01 PRIMARY INSOMNIA: ICD-10-CM

## 2025-08-30 DIAGNOSIS — E66.813 CLASS 3 SEVERE OBESITY WITH BODY MASS INDEX (BMI) OF 40.0 TO 44.9 IN ADULT, UNSPECIFIED OBESITY TYPE, UNSPECIFIED WHETHER SERIOUS COMORBIDITY PRESENT: ICD-10-CM

## 2025-09-02 RX ORDER — PHENTERMINE AND TOPIRAMATE EXTENDED-RELEASE 7.5; 46 MG/1; MG/1
1 CAPSULE ORAL DAILY
Qty: 30 CAPSULE | Refills: 0 | Status: SHIPPED | OUTPATIENT
Start: 2025-09-02

## 2025-09-02 RX ORDER — ZOLPIDEM TARTRATE 5 MG/1
5 TABLET ORAL NIGHTLY PRN
Qty: 30 TABLET | Refills: 3 | Status: SHIPPED | OUTPATIENT
Start: 2025-09-02